# Patient Record
Sex: MALE | Race: WHITE | NOT HISPANIC OR LATINO | Employment: FULL TIME | URBAN - METROPOLITAN AREA
[De-identification: names, ages, dates, MRNs, and addresses within clinical notes are randomized per-mention and may not be internally consistent; named-entity substitution may affect disease eponyms.]

---

## 2017-01-23 ENCOUNTER — OFFICE VISIT - HEALTHEAST (OUTPATIENT)
Dept: FAMILY MEDICINE | Facility: CLINIC | Age: 52
End: 2017-01-23

## 2017-01-23 DIAGNOSIS — Z12.5 SCREENING FOR PROSTATE CANCER: ICD-10-CM

## 2017-01-23 DIAGNOSIS — E78.01 FAMILIAL HYPERCHOLESTEROLEMIA: ICD-10-CM

## 2017-01-23 DIAGNOSIS — R53.83 FATIGUE: ICD-10-CM

## 2017-01-23 DIAGNOSIS — I10 ESSENTIAL HYPERTENSION WITH GOAL BLOOD PRESSURE LESS THAN 130/80: ICD-10-CM

## 2017-01-23 DIAGNOSIS — I40.9 ACUTE MYOCARDITIS, UNSPECIFIED: ICD-10-CM

## 2017-01-23 DIAGNOSIS — F32.A MILD DEPRESSION: ICD-10-CM

## 2017-01-23 DIAGNOSIS — K21.00 GASTROESOPHAGEAL REFLUX DISEASE WITH ESOPHAGITIS: ICD-10-CM

## 2017-01-23 DIAGNOSIS — Z00.00 ROUTINE GENERAL MEDICAL EXAMINATION AT A HEALTH CARE FACILITY: ICD-10-CM

## 2017-01-23 LAB
CHOLEST SERPL-MCNC: 191 MG/DL
FASTING STATUS PATIENT QL REPORTED: YES
HDLC SERPL-MCNC: 33 MG/DL
LDLC SERPL CALC-MCNC: 113 MG/DL
PSA SERPL-MCNC: 1 NG/ML (ref 0–3.5)
TRIGL SERPL-MCNC: 223 MG/DL

## 2017-01-23 ASSESSMENT — MIFFLIN-ST. JEOR: SCORE: 2027.87

## 2017-01-24 ENCOUNTER — COMMUNICATION - HEALTHEAST (OUTPATIENT)
Dept: FAMILY MEDICINE | Facility: CLINIC | Age: 52
End: 2017-01-24

## 2017-03-29 ENCOUNTER — COMMUNICATION - HEALTHEAST (OUTPATIENT)
Dept: FAMILY MEDICINE | Facility: CLINIC | Age: 52
End: 2017-03-29

## 2017-03-29 DIAGNOSIS — K21.9 GERD (GASTROESOPHAGEAL REFLUX DISEASE): ICD-10-CM

## 2017-07-13 ENCOUNTER — OFFICE VISIT - HEALTHEAST (OUTPATIENT)
Dept: FAMILY MEDICINE | Facility: CLINIC | Age: 52
End: 2017-07-13

## 2017-07-13 DIAGNOSIS — R31.9 HEMATURIA: ICD-10-CM

## 2017-07-13 DIAGNOSIS — B35.6 TINEA CRURIS: ICD-10-CM

## 2017-07-17 ENCOUNTER — AMBULATORY - HEALTHEAST (OUTPATIENT)
Dept: FAMILY MEDICINE | Facility: CLINIC | Age: 52
End: 2017-07-17

## 2017-07-17 ENCOUNTER — COMMUNICATION - HEALTHEAST (OUTPATIENT)
Dept: FAMILY MEDICINE | Facility: CLINIC | Age: 52
End: 2017-07-17

## 2017-07-24 ENCOUNTER — AMBULATORY - HEALTHEAST (OUTPATIENT)
Dept: FAMILY MEDICINE | Facility: CLINIC | Age: 52
End: 2017-07-24

## 2017-09-28 ENCOUNTER — OFFICE VISIT (OUTPATIENT)
Dept: URGENT CARE | Facility: URGENT CARE | Age: 52
End: 2017-09-28
Payer: COMMERCIAL

## 2017-09-28 ENCOUNTER — RECORDS - HEALTHEAST (OUTPATIENT)
Dept: ADMINISTRATIVE | Facility: OTHER | Age: 52
End: 2017-09-28

## 2017-09-28 VITALS
BODY MASS INDEX: 34.44 KG/M2 | TEMPERATURE: 97.7 F | SYSTOLIC BLOOD PRESSURE: 108 MMHG | HEART RATE: 72 BPM | OXYGEN SATURATION: 95 % | DIASTOLIC BLOOD PRESSURE: 72 MMHG | WEIGHT: 246.8 LBS

## 2017-09-28 DIAGNOSIS — R10.13 EPIGASTRIC PAIN: ICD-10-CM

## 2017-09-28 DIAGNOSIS — R53.83 FATIGUE, UNSPECIFIED TYPE: ICD-10-CM

## 2017-09-28 DIAGNOSIS — K29.00 ACUTE GASTRITIS WITHOUT HEMORRHAGE, UNSPECIFIED GASTRITIS TYPE: Primary | ICD-10-CM

## 2017-09-28 LAB
ERYTHROCYTE [DISTWIDTH] IN BLOOD BY AUTOMATED COUNT: 12.7 % (ref 10–15)
HCT VFR BLD AUTO: 49.2 % (ref 40–53)
HGB BLD-MCNC: 16.6 G/DL (ref 13.3–17.7)
MCH RBC QN AUTO: 31.9 PG (ref 26.5–33)
MCHC RBC AUTO-ENTMCNC: 33.7 G/DL (ref 31.5–36.5)
MCV RBC AUTO: 95 FL (ref 78–100)
PLATELET # BLD AUTO: 242 10E9/L (ref 150–450)
RBC # BLD AUTO: 5.2 10E12/L (ref 4.4–5.9)
WBC # BLD AUTO: 9.7 10E9/L (ref 4–11)

## 2017-09-28 PROCEDURE — 82306 VITAMIN D 25 HYDROXY: CPT | Performed by: FAMILY MEDICINE

## 2017-09-28 PROCEDURE — 85027 COMPLETE CBC AUTOMATED: CPT | Performed by: FAMILY MEDICINE

## 2017-09-28 PROCEDURE — 82550 ASSAY OF CK (CPK): CPT | Performed by: FAMILY MEDICINE

## 2017-09-28 PROCEDURE — 99203 OFFICE O/P NEW LOW 30 MIN: CPT | Performed by: FAMILY MEDICINE

## 2017-09-28 PROCEDURE — 80048 BASIC METABOLIC PNL TOTAL CA: CPT | Performed by: FAMILY MEDICINE

## 2017-09-28 PROCEDURE — 84443 ASSAY THYROID STIM HORMONE: CPT | Performed by: FAMILY MEDICINE

## 2017-09-28 PROCEDURE — 36415 COLL VENOUS BLD VENIPUNCTURE: CPT | Performed by: FAMILY MEDICINE

## 2017-09-28 PROCEDURE — 93000 ELECTROCARDIOGRAM COMPLETE: CPT | Performed by: FAMILY MEDICINE

## 2017-09-28 RX ORDER — CLOPIDOGREL BISULFATE 75 MG/1
TABLET ORAL
COMMUNITY
Start: 2017-01-23

## 2017-09-28 RX ORDER — LOSARTAN POTASSIUM 100 MG/1
TABLET ORAL
COMMUNITY
Start: 2017-01-23

## 2017-09-28 RX ORDER — HYDROCHLOROTHIAZIDE 50 MG/1
TABLET ORAL
COMMUNITY
Start: 2017-01-23

## 2017-09-28 RX ORDER — ATORVASTATIN CALCIUM 80 MG/1
80 TABLET, FILM COATED ORAL
COMMUNITY
Start: 2017-01-23

## 2017-09-28 RX ORDER — SERTRALINE HYDROCHLORIDE 100 MG/1
TABLET, FILM COATED ORAL
COMMUNITY
Start: 2017-01-23

## 2017-09-28 RX ORDER — ERGOCALCIFEROL 1.25 MG/1
50000 CAPSULE, LIQUID FILLED ORAL
COMMUNITY

## 2017-09-28 RX ORDER — METOPROLOL SUCCINATE 25 MG/1
TABLET, EXTENDED RELEASE ORAL
COMMUNITY
Start: 2017-08-17

## 2017-09-28 RX ORDER — POTASSIUM CHLORIDE 750 MG/1
CAPSULE, EXTENDED RELEASE ORAL
COMMUNITY
Start: 2016-09-16

## 2017-09-28 RX ORDER — MULTIVIT-MIN/IRON/FOLIC ACID/K 18-600-40
CAPSULE ORAL
COMMUNITY

## 2017-09-28 NOTE — PATIENT INSTRUCTIONS
Epigastric Pain (Uncertain Cause)     Epigastric pain can be a sign of disease in the upper abdomen. Common causes include:    Acid reflux (stomach acid flowing up into the esophagus)    Gastritis (irritation of the stomach lining)    Peptic Ulcer Disease    Inflammation of the pancreas    Gallstone    Infection in the gallbladder  Pain may be dull or burning. It may spread upward to the chest or to the back. There may be other symptoms such as belching, bloating, cramps or hunger pains. There may be weight loss or poor appetite, nausea or vomiting.  Since the diagnosis of your pain is not certain yet, further tests may sometimes be needed. Sometimes the doctor will treat you for the most likely condition to see if there is improvement before doing further tests.  Home care  Medicines    Antacids help neutralize the normal acids in your stomach. Examples are Maalox, Mylanta, Rolaids, and Tums. If you don t like the liquid, you can also try a chewable one. You may find one works better than another for you. Overuse can cause diarrhea or constipation.    Acid blockers (H2 blockers) decrease acid production. Examples are cimetidine (Tagamet), famotidine (Pepcid) and ranitidine (Zantac).    Acid inhibitors (PPIs) decrease acid production in a different way than the blockers. You may find they work better, but can take a little longer to take effect.  Examples are omeprazole (Prilosec), lansoprazole (Prevacid), pantoprazole (Protonix), rabeprazole (Aciphex), and esomeprazole (Nexium).    Take an antacid 30-60 minutes after eating and at bedtime, but not at the same time as an acid blocker.    Try not to take NSAIDs. Aspirin may also cause problems, but if taking it for your heart or other medical reasons, talk to your doctor before stopping it; you do not want to cause a worse problem, like a heart attack or stroke.  Diet    If certain foods seem to cause your spasm, try to avoid them.     Eat slowly and chew food well  before swallowing. Symptoms of gastritis can be worsened by certain foods. Limit or avoid fatty, fried, and spicy foods, as well as coffee, chocolate, mint, and foods with high acid content such as tomatoes and citrus fruit and juices (orange, grapefruit, lemon).    Avoid alcohol, caffeine, and tobacco, which can delay healing and worsen your problem.    Try eating smaller meals with snacks in between  Follow-up care  Follow up with your healthcare provider or as advised.  When to seek medical advice  Call your healthcare provider right away if any of the following occur:    Stomach pain worsens or moves to the right lower part of the abdomen    Chest pain appears, or if it worsens or spreads to the chest, back, neck, shoulder, or arm    Frequent vomiting (can t keep down liquids)    Blood in the stool or vomit (red or black color)    Feeling weak or dizzy, fainting, or having trouble breathing    Fever of 100.4 F (38 C) or higher, or as directed by your healthcare provider    Abdominal swelling  Date Last Reviewed: 9/25/2015 2000-2017 [employer]. [address] [place] [address], [place] [address]. All rights reserved. This information is not intended as a substitute for professional medical care. Always follow your healthcare professional's instructions.

## 2017-09-28 NOTE — LETTER
[facility]  [address]  [address]  [place] [address]  Phone: [phone]  Fax: [phone]    09/28/17    Osbaldo Garcia  [address] [address] [address]      To whom it may concern:     Osbaldo Garcia was seen tonight at [facility].  He was unable to attend work today secondary to a medical illness.  Thank you.     Sincerely,      Lamar Woodruff MD

## 2017-09-28 NOTE — MR AVS SNAPSHOT
After Visit Summary   9/28/2017    Osbaldo Garcia    MRN: [id number]           Patient Information     Date Of Birth          1965        Visit Information        Provider Department      9/28/2017 4:45 PM Lamar Lambert MD [facility]        Today's Diagnoses     Epigastric pain    -  1    Fatigue, unspecified type          Care Instructions      Epigastric Pain (Uncertain Cause)     Epigastric pain can be a sign of disease in the upper abdomen. Common causes include:    Acid reflux (stomach acid flowing up into the esophagus)    Gastritis (irritation of the stomach lining)    Peptic Ulcer Disease    Inflammation of the pancreas    Gallstone    Infection in the gallbladder  Pain may be dull or burning. It may spread upward to the chest or to the back. There may be other symptoms such as belching, bloating, cramps or hunger pains. There may be weight loss or poor appetite, nausea or vomiting.  Since the diagnosis of your pain is not certain yet, further tests may sometimes be needed. Sometimes the doctor will treat you for the most likely condition to see if there is improvement before doing further tests.  Home care  Medicines    Antacids help neutralize the normal acids in your stomach. Examples are Maalox, Mylanta, Rolaids, and Tums. If you don t like the liquid, you can also try a chewable one. You may find one works better than another for you. Overuse can cause diarrhea or constipation.    Acid blockers (H2 blockers) decrease acid production. Examples are cimetidine (Tagamet), famotidine (Pepcid) and ranitidine (Zantac).    Acid inhibitors (PPIs) decrease acid production in a different way than the blockers. You may find they work better, but can take a little longer to take effect.  Examples are omeprazole (Prilosec), lansoprazole (Prevacid), pantoprazole (Protonix), rabeprazole (Aciphex), and esomeprazole (Nexium).    Take an antacid 30-60 minutes after eating  and at bedtime, but not at the same time as an acid blocker.    Try not to take NSAIDs. Aspirin may also cause problems, but if taking it for your heart or other medical reasons, talk to your doctor before stopping it; you do not want to cause a worse problem, like a heart attack or stroke.  Diet    If certain foods seem to cause your spasm, try to avoid them.     Eat slowly and chew food well before swallowing. Symptoms of gastritis can be worsened by certain foods. Limit or avoid fatty, fried, and spicy foods, as well as coffee, chocolate, mint, and foods with high acid content such as tomatoes and citrus fruit and juices (orange, grapefruit, lemon).    Avoid alcohol, caffeine, and tobacco, which can delay healing and worsen your problem.    Try eating smaller meals with snacks in between  Follow-up care  Follow up with your healthcare provider or as advised.  When to seek medical advice  Call your healthcare provider right away if any of the following occur:    Stomach pain worsens or moves to the right lower part of the abdomen    Chest pain appears, or if it worsens or spreads to the chest, back, neck, shoulder, or arm    Frequent vomiting (can t keep down liquids)    Blood in the stool or vomit (red or black color)    Feeling weak or dizzy, fainting, or having trouble breathing    Fever of 100.4 F (38 C) or higher, or as directed by your healthcare provider    Abdominal swelling  Date Last Reviewed: 9/25/2015 2000-2017 [employer]. [address] [place] [address]. All rights reserved. This information is not intended as a substitute for professional medical care. Always follow your healthcare professional's instructions.                Follow-ups after your visit        Follow-up notes from your care team     Return in about 5 days (around 10/3/2017).      Who to contact     If you have questions or need follow up information about today's clinic visit or your schedule please contact  "[facility] directly at [phone].  Normal or non-critical lab and imaging results will be communicated to you by MyChart, letter or phone within 4 business days after the clinic has received the results. If you do not hear from us within 7 days, please contact the clinic through [employer] or phone. If you have a critical or abnormal lab result, we will notify you by phone as soon as possible.  Submit refill requests through [employer] or call your pharmacy and they will forward the refill request to us. Please allow 3 business days for your refill to be completed.          Additional Information About Your Visit        [employer] Information     [employer] lets you send messages to your doctor, view your test results, renew your prescriptions, schedule appointments and more. To sign up, go to [email or web address] . Click on \"Log in\" on the left side of the screen, which will take you to the Welcome page. Then click on \"Sign up Now\" on the right side of the page.     You will be asked to enter the access code listed below, as well as some personal information. Please follow the directions to create your username and password.     Your access code is: [email or web address]  Expires: 2017  6:21 PM     Your access code will  in 90 days. If you need help or a new code, please call your [place] clinic or [phone].        Care EveryWhere ID     This is your Care EveryWhere ID. This could be used by other organizations to access your [place] medical records  [id number]        Your Vitals Were     Pulse Temperature Pulse Oximetry BMI (Body Mass Index)          72 97.7  F (36.5  C) (Tympanic) 95% 34.44 kg/m2         Blood Pressure from Last 3 Encounters:   17 108/72    Weight from Last 3 Encounters:   17 246 lb 12.8 oz (111.9 kg)   03/05/15 253 lb (114.8 kg)   02/05/15 255 lb (115.7 kg)              We Performed the Following     Basic metabolic panel  (Ca, Cl, CO2, Creat, Gluc, K, Na, BUN)  "    CBC with platelets     CK total     EKG 12-lead complete w/read - Clinics     TSH with free T4 reflex     Vitamin D Deficiency          Today's Medication Changes          These changes are accurate as of: 9/28/17  6:21 PM.  If you have any questions, ask your nurse or doctor.               Start taking these medicines.        Dose/Directions    lidocaine (viscous) 15 mL, alum & mag hydroxide-simethicone 15 mL GI Cocktail   Used for:  Epigastric pain   Started by:  Lamar Lambert MD        Dose:  30 mL   Take 30 mLs by mouth once for 1 dose   Quantity:  30 mL   Refills:  0       ranitidine 150 MG tablet   Commonly known as:  ZANTAC   Used for:  Epigastric pain   Started by:  Lamar Lambert MD        Dose:  150 mg   Take 1 tablet (150 mg) by mouth 2 times daily   Quantity:  60 tablet   Refills:  1            Where to get your medicines      These medications were sent to [facility] [address] [place] [id number] [facility]  [address] [address] [facility] [address]     Phone:  [phone]     ranitidine 150 MG tablet         Some of these will need a paper prescription and others can be bought over the counter.  Ask your nurse if you have questions.     You don't need a prescription for these medications     lidocaine (viscous) 15 mL, alum & mag hydroxide-simethicone 15 mL GI Cocktail                Primary Care Provider    None Specified       No primary provider on file.        Equal Access to Services     [facility] CenterEVANGELIST : Hadgeoff garcia Somartin, waaxda luqadaha, qaybta kaalmada aderuel, munira tang. So [facility] [phone].    ATENCIÓN: Si habla español, tiene a garcia disposición servicios gratuitos de asistencia lingüística. Laura al [phone].    We comply with applicable federal civil rights laws and [place] laws. We do not discriminate on the basis of race, color, national origin, age, disability sex, sexual orientation or gender  identity.            Thank you!     Thank you for choosing [facility]  for your care. Our goal is always to provide you with excellent care. Hearing back from our patients is one way we can continue to improve our services. Please take a few minutes to complete the written survey that you may receive in the mail after your visit with us. Thank you!             Your Updated Medication List - Protect others around you: Learn how to safely use, store and throw away your medicines at www.disposemymeds.org.          This list is accurate as of: 9/28/17  6:21 PM.  Always use your most recent med list.                   Brand Name Dispense Instructions for use Diagnosis    atorvastatin 80 MG tablet    LIPITOR     Take 80 mg by mouth        cholecalciferol 1000 UNIT tablet    vitamin D     Take 1,000 Units by mouth        clopidogrel 75 MG tablet    PLAVIX     Take 75 mg by mouth.        hydrochlorothiazide 50 MG tablet    HYDRODIURIL     Take 50 mg by mouth.        lidocaine (viscous) 15 mL, alum & mag hydroxide-simethicone 15 mL GI Cocktail     30 mL    Take 30 mLs by mouth once for 1 dose    Epigastric pain       losartan 100 MG tablet    COZAAR     Take 100 mg by mouth.        metoprolol 25 MG 24 hr tablet    TOPROL-XL     TAKE ONE TABLET BY MOUTH ONCE DAILY *DUE  FOR  LABS*        omeprazole 20 MG CR capsule    priLOSEC     TAKE ONE CAPSULE BY MOUTH TWICE DAILY        potassium chloride 10 MEQ CR capsule    MICRO-K     TAKE ONE CAPSULE BY MOUTH ONCE DAILY        PROBIOTIC DAILY PO           ranitidine 150 MG tablet    ZANTAC    60 tablet    Take 1 tablet (150 mg) by mouth 2 times daily    Epigastric pain       sertraline 100 MG tablet    ZOLOFT     TAKE ONE TABLET (50MG) BY MOUTH ONCE DAILY        Vitamin C 500 MG Caps           vitamin D 96137 UNIT capsule    ERGOCALCIFEROL     Take 50,000 Units by mouth

## 2017-09-28 NOTE — NURSING NOTE
"Chief Complaint   Patient presents with     Urgent Care     Gastrointestinal Problem     fatigued, gastrointestinal problems x 1 week, comes and goes. Patient has had a recent medication change.     Anxiety     anxiety x1 week. Patient states it may have been a heart attack.       Initial /72 (BP Location: Right arm, Patient Position: Chair, Cuff Size: Adult Large)  Pulse 72  Temp 97.7  F (36.5  C) (Tympanic)  Wt 246 lb 12.8 oz (111.9 kg)  SpO2 95%  BMI 34.44 kg/m2 Estimated body mass index is 34.44 kg/(m^2) as calculated from the following:    Height as of 3/5/15: 5' 10.98\" (1.803 m).    Weight as of this encounter: 246 lb 12.8 oz (111.9 kg).  Medication Reconciliation: unable or not appropriate to perform   Dulce Maria Valerio Medical Assistant      "

## 2017-09-28 NOTE — PROGRESS NOTES
"(S) Osbaldo Garcia is a 52 year old male with complaint of gastrointestinal symptoms of pain in the epigastric area on and off for the past 2 months.  Takes Prilosec BID.  Has been feeling very tired for the past 4 days.  Did not go to work this week and is anxious about his job since he has not been able to go. No fevers.  No nausea or vomiting.  Has watery stools about 1 time per week.  Today, had an orange looking stool.  No blood in the stool.  No recent travels.  No ill contacts.  Eating about 1 time per day secondary to decreased appetite.      Also, reports had a \"panic attack\" last week and did call his cardiologist thinking it could be his heart but told to go to the ED.  He did not go and is wondering whether we can do some blood testing for that today.  No angina pain noted.  He did suffer a MI in 2014 and that pain was substernal.  No angioplasty or stents placed. Saw his PCP 6 weeks ago at [facility].         (O) /72 (BP Location: Right arm, Patient Position: Chair, Cuff Size: Adult Large)  Pulse 72  Temp 97.7  F (36.5  C) (Tympanic)  Wt 246 lb 12.8 oz (111.9 kg)  SpO2 95%  BMI 34.44 kg/m2   Physical exam reveals the patient appears well. HEENT: neck: supple, no adenopathy.  Lungs: CTA B. CV: RRR, normal S1, S2, no murmurs. Abdomen: +BS, mild and moderate tenderness in the epigastric area, no rebound tenderness, no guarding or rigidity, no CVA tenderness.    EKG: NSR at 70 bpm, no ischemic changes  Labs:   Results for orders placed or performed in visit on 09/28/17   CBC with platelets   Result Value Ref Range    WBC 9.7 4.0 - 11.0 10e9/L    RBC Count 5.20 4.4 - 5.9 10e12/L    Hemoglobin 16.6 13.3 - 17.7 g/dL    Hematocrit 49.2 40.0 - 53.0 %    MCV 95 78 - 100 fl    MCH 31.9 26.5 - 33.0 pg    MCHC 33.7 31.5 - 36.5 g/dL    RDW 12.7 10.0 - 15.0 %    Platelet Count 242 150 - 450 10e9/L        (A) Gastritis and Fatigue    (P) GI cocktail helped his pain.  I have recommended avoid " gastritis triggers.  Stop Prilosec for now.  Start Zantac as per orders. Note for work given.,  Return office visit if symptoms persist or worsen; I have alerted the patient to call if high fever, dehydration, marked weakness, fainting, increased abdominal pain, blood in stool or vomit.  He is to see his cardiologist for further cares.  Await pending labs.   Lamar Woodruff MD

## 2017-09-29 LAB
ANION GAP SERPL CALCULATED.3IONS-SCNC: 9 MMOL/L (ref 3–14)
BUN SERPL-MCNC: 20 MG/DL (ref 7–30)
CALCIUM SERPL-MCNC: 9.4 MG/DL (ref 8.5–10.1)
CHLORIDE SERPL-SCNC: 106 MMOL/L (ref 94–109)
CK SERPL-CCNC: 171 U/L (ref 30–300)
CO2 SERPL-SCNC: 27 MMOL/L (ref 20–32)
CREAT SERPL-MCNC: 0.94 MG/DL (ref 0.66–1.25)
DEPRECATED CALCIDIOL+CALCIFEROL SERPL-MC: 41 UG/L (ref 20–75)
GFR SERPL CREATININE-BSD FRML MDRD: 84 ML/MIN/1.7M2
GLUCOSE SERPL-MCNC: 87 MG/DL (ref 70–99)
POTASSIUM SERPL-SCNC: 4.2 MMOL/L (ref 3.4–5.3)
SODIUM SERPL-SCNC: 142 MMOL/L (ref 133–144)
TSH SERPL DL<=0.005 MIU/L-ACNC: 2.65 MU/L (ref 0.4–4)

## 2017-10-06 ENCOUNTER — COMMUNICATION - HEALTHEAST (OUTPATIENT)
Dept: FAMILY MEDICINE | Facility: CLINIC | Age: 52
End: 2017-10-06

## 2017-10-11 ENCOUNTER — AMBULATORY - HEALTHEAST (OUTPATIENT)
Dept: FAMILY MEDICINE | Facility: CLINIC | Age: 52
End: 2017-10-11

## 2017-10-18 ENCOUNTER — OFFICE VISIT - HEALTHEAST (OUTPATIENT)
Dept: FAMILY MEDICINE | Facility: CLINIC | Age: 52
End: 2017-10-18

## 2017-10-18 DIAGNOSIS — M79.10 MYALGIA: ICD-10-CM

## 2017-10-18 DIAGNOSIS — G47.33 OSA (OBSTRUCTIVE SLEEP APNEA): ICD-10-CM

## 2017-10-18 DIAGNOSIS — R10.9 STOMACH PAIN: ICD-10-CM

## 2017-10-18 DIAGNOSIS — Z23 FLU VACCINE NEED: ICD-10-CM

## 2017-10-18 DIAGNOSIS — I10 ESSENTIAL HYPERTENSION: ICD-10-CM

## 2017-10-18 DIAGNOSIS — Z12.11 SCREEN FOR COLON CANCER: ICD-10-CM

## 2017-10-18 DIAGNOSIS — E78.01 FAMILIAL HYPERCHOLESTEROLEMIA: ICD-10-CM

## 2017-10-18 ASSESSMENT — MIFFLIN-ST. JEOR: SCORE: 1973.44

## 2017-10-20 ENCOUNTER — COMMUNICATION - HEALTHEAST (OUTPATIENT)
Dept: FAMILY MEDICINE | Facility: CLINIC | Age: 52
End: 2017-10-20

## 2017-11-03 ENCOUNTER — RECORDS - HEALTHEAST (OUTPATIENT)
Dept: ADMINISTRATIVE | Facility: OTHER | Age: 52
End: 2017-11-03

## 2017-11-06 ENCOUNTER — RECORDS - HEALTHEAST (OUTPATIENT)
Dept: ADMINISTRATIVE | Facility: OTHER | Age: 52
End: 2017-11-06

## 2017-12-04 ENCOUNTER — COMMUNICATION - HEALTHEAST (OUTPATIENT)
Dept: FAMILY MEDICINE | Facility: CLINIC | Age: 52
End: 2017-12-04

## 2018-02-12 ENCOUNTER — COMMUNICATION - HEALTHEAST (OUTPATIENT)
Dept: FAMILY MEDICINE | Facility: CLINIC | Age: 53
End: 2018-02-12

## 2018-02-12 DIAGNOSIS — F32.A MILD DEPRESSION: ICD-10-CM

## 2018-02-12 DIAGNOSIS — I10 ESSENTIAL HYPERTENSION WITH GOAL BLOOD PRESSURE LESS THAN 130/80: ICD-10-CM

## 2018-02-26 ENCOUNTER — OFFICE VISIT - HEALTHEAST (OUTPATIENT)
Dept: FAMILY MEDICINE | Facility: CLINIC | Age: 53
End: 2018-02-26

## 2018-02-26 DIAGNOSIS — I25.10 CAD (CORONARY ARTERY DISEASE): ICD-10-CM

## 2018-02-26 DIAGNOSIS — E78.01 FAMILIAL HYPERCHOLESTEROLEMIA: ICD-10-CM

## 2018-02-26 DIAGNOSIS — I10 HYPERTENSION: ICD-10-CM

## 2018-02-26 DIAGNOSIS — M60.9 MYOSITIS: ICD-10-CM

## 2018-02-26 DIAGNOSIS — R21 RASH: ICD-10-CM

## 2018-02-26 LAB
ANION GAP SERPL CALCULATED.3IONS-SCNC: 11 MMOL/L (ref 5–18)
BUN SERPL-MCNC: 16 MG/DL (ref 8–22)
CALCIUM SERPL-MCNC: 9.5 MG/DL (ref 8.5–10.5)
CHLORIDE BLD-SCNC: 103 MMOL/L (ref 98–107)
CHOLEST SERPL-MCNC: 263 MG/DL
CK SERPL-CCNC: 142 U/L (ref 30–190)
CO2 SERPL-SCNC: 28 MMOL/L (ref 22–31)
CREAT SERPL-MCNC: 0.92 MG/DL (ref 0.7–1.3)
ERYTHROCYTE [DISTWIDTH] IN BLOOD BY AUTOMATED COUNT: 11.6 % (ref 11–14.5)
FASTING STATUS PATIENT QL REPORTED: YES
GFR SERPL CREATININE-BSD FRML MDRD: >60 ML/MIN/1.73M2
GLUCOSE BLD-MCNC: 98 MG/DL (ref 70–125)
HCT VFR BLD AUTO: 50.8 % (ref 40–54)
HDLC SERPL-MCNC: 43 MG/DL
HGB BLD-MCNC: 17.2 G/DL (ref 14–18)
LDLC SERPL CALC-MCNC: 181 MG/DL
MCH RBC QN AUTO: 32.2 PG (ref 27–34)
MCHC RBC AUTO-ENTMCNC: 33.8 G/DL (ref 32–36)
MCV RBC AUTO: 95 FL (ref 80–100)
PLATELET # BLD AUTO: 250 THOU/UL (ref 140–440)
PMV BLD AUTO: 7.7 FL (ref 7–10)
POTASSIUM BLD-SCNC: 4.3 MMOL/L (ref 3.5–5)
RBC # BLD AUTO: 5.33 MILL/UL (ref 4.4–6.2)
SODIUM SERPL-SCNC: 142 MMOL/L (ref 136–145)
TRIGL SERPL-MCNC: 195 MG/DL
WBC: 6.9 THOU/UL (ref 4–11)

## 2018-02-26 ASSESSMENT — MIFFLIN-ST. JEOR: SCORE: 1996.12

## 2018-02-27 ENCOUNTER — COMMUNICATION - HEALTHEAST (OUTPATIENT)
Dept: FAMILY MEDICINE | Facility: CLINIC | Age: 53
End: 2018-02-27

## 2018-02-27 ENCOUNTER — AMBULATORY - HEALTHEAST (OUTPATIENT)
Dept: FAMILY MEDICINE | Facility: CLINIC | Age: 53
End: 2018-02-27

## 2018-05-11 ENCOUNTER — COMMUNICATION - HEALTHEAST (OUTPATIENT)
Dept: FAMILY MEDICINE | Facility: CLINIC | Age: 53
End: 2018-05-11

## 2018-05-11 DIAGNOSIS — I10 HYPERTENSION: ICD-10-CM

## 2018-06-06 ENCOUNTER — COMMUNICATION - HEALTHEAST (OUTPATIENT)
Dept: FAMILY MEDICINE | Facility: CLINIC | Age: 53
End: 2018-06-06

## 2018-06-06 DIAGNOSIS — I10 ESSENTIAL HYPERTENSION WITH GOAL BLOOD PRESSURE LESS THAN 130/80: ICD-10-CM

## 2018-08-29 ENCOUNTER — OFFICE VISIT - HEALTHEAST (OUTPATIENT)
Dept: FAMILY MEDICINE | Facility: CLINIC | Age: 53
End: 2018-08-29

## 2018-08-29 DIAGNOSIS — I25.10 CAD (CORONARY ARTERY DISEASE): ICD-10-CM

## 2018-08-29 DIAGNOSIS — Z12.5 SCREENING FOR PROSTATE CANCER: ICD-10-CM

## 2018-08-29 DIAGNOSIS — E78.01 FAMILIAL HYPERCHOLESTEROLEMIA: ICD-10-CM

## 2018-08-29 DIAGNOSIS — I10 ESSENTIAL HYPERTENSION: ICD-10-CM

## 2018-08-29 DIAGNOSIS — Z00.00 ROUTINE GENERAL MEDICAL EXAMINATION AT A HEALTH CARE FACILITY: ICD-10-CM

## 2018-08-29 LAB
ALBUMIN SERPL-MCNC: 3.8 G/DL (ref 3.5–5)
ALP SERPL-CCNC: 66 U/L (ref 45–120)
ALT SERPL W P-5'-P-CCNC: 34 U/L (ref 0–45)
ANION GAP SERPL CALCULATED.3IONS-SCNC: 8 MMOL/L (ref 5–18)
AST SERPL W P-5'-P-CCNC: 24 U/L (ref 0–40)
ATRIAL RATE - MUSE: 54 BPM
BILIRUB SERPL-MCNC: 0.4 MG/DL (ref 0–1)
BUN SERPL-MCNC: 18 MG/DL (ref 8–22)
CALCIUM SERPL-MCNC: 9.3 MG/DL (ref 8.5–10.5)
CHLORIDE BLD-SCNC: 109 MMOL/L (ref 98–107)
CHOLEST SERPL-MCNC: 219 MG/DL
CO2 SERPL-SCNC: 25 MMOL/L (ref 22–31)
CREAT SERPL-MCNC: 0.94 MG/DL (ref 0.7–1.3)
DIASTOLIC BLOOD PRESSURE - MUSE: NORMAL MMHG
ERYTHROCYTE [DISTWIDTH] IN BLOOD BY AUTOMATED COUNT: 11.7 % (ref 11–14.5)
FASTING STATUS PATIENT QL REPORTED: ABNORMAL
GFR SERPL CREATININE-BSD FRML MDRD: >60 ML/MIN/1.73M2
GLUCOSE BLD-MCNC: 103 MG/DL (ref 70–125)
HCT VFR BLD AUTO: 45.9 % (ref 40–54)
HDLC SERPL-MCNC: 36 MG/DL
HGB BLD-MCNC: 15.6 G/DL (ref 14–18)
INTERPRETATION ECG - MUSE: NORMAL
LDLC SERPL CALC-MCNC: 130 MG/DL
MCH RBC QN AUTO: 31.2 PG (ref 27–34)
MCHC RBC AUTO-ENTMCNC: 33.9 G/DL (ref 32–36)
MCV RBC AUTO: 92 FL (ref 80–100)
P AXIS - MUSE: 43 DEGREES
PLATELET # BLD AUTO: 264 THOU/UL (ref 140–440)
PMV BLD AUTO: 7.7 FL (ref 7–10)
POTASSIUM BLD-SCNC: 4.6 MMOL/L (ref 3.5–5)
PR INTERVAL - MUSE: 128 MS
PROT SERPL-MCNC: 6.6 G/DL (ref 6–8)
PSA SERPL-MCNC: 1 NG/ML (ref 0–3.5)
QRS DURATION - MUSE: 90 MS
QT - MUSE: 430 MS
QTC - MUSE: 407 MS
R AXIS - MUSE: 37 DEGREES
RBC # BLD AUTO: 4.99 MILL/UL (ref 4.4–6.2)
SODIUM SERPL-SCNC: 142 MMOL/L (ref 136–145)
SYSTOLIC BLOOD PRESSURE - MUSE: NORMAL MMHG
T AXIS - MUSE: 19 DEGREES
TRIGL SERPL-MCNC: 263 MG/DL
TSH SERPL DL<=0.005 MIU/L-ACNC: 1.16 UIU/ML (ref 0.3–5)
VENTRICULAR RATE- MUSE: 54 BPM
WBC: 6.5 THOU/UL (ref 4–11)

## 2018-08-29 ASSESSMENT — MIFFLIN-ST. JEOR: SCORE: 2018.8

## 2018-08-30 ENCOUNTER — AMBULATORY - HEALTHEAST (OUTPATIENT)
Dept: FAMILY MEDICINE | Facility: CLINIC | Age: 53
End: 2018-08-30

## 2018-08-30 LAB — 25(OH)D3 SERPL-MCNC: 31.7 NG/ML (ref 30–80)

## 2018-08-31 ENCOUNTER — COMMUNICATION - HEALTHEAST (OUTPATIENT)
Dept: FAMILY MEDICINE | Facility: CLINIC | Age: 53
End: 2018-08-31

## 2018-12-03 ENCOUNTER — OFFICE VISIT - HEALTHEAST (OUTPATIENT)
Dept: FAMILY MEDICINE | Facility: CLINIC | Age: 53
End: 2018-12-03

## 2018-12-03 DIAGNOSIS — Z23 FLU VACCINE NEED: ICD-10-CM

## 2018-12-03 DIAGNOSIS — E78.00 HYPERCHOLESTEROLEMIA: ICD-10-CM

## 2018-12-03 DIAGNOSIS — I10 BENIGN ESSENTIAL HYPERTENSION: ICD-10-CM

## 2018-12-03 LAB
ANION GAP SERPL CALCULATED.3IONS-SCNC: 9 MMOL/L (ref 5–18)
BUN SERPL-MCNC: 15 MG/DL (ref 8–22)
CALCIUM SERPL-MCNC: 9.7 MG/DL (ref 8.5–10.5)
CHLORIDE BLD-SCNC: 104 MMOL/L (ref 98–107)
CHOLEST SERPL-MCNC: 228 MG/DL
CO2 SERPL-SCNC: 28 MMOL/L (ref 22–31)
CREAT SERPL-MCNC: 0.8 MG/DL (ref 0.7–1.3)
FASTING STATUS PATIENT QL REPORTED: YES
GFR SERPL CREATININE-BSD FRML MDRD: >60 ML/MIN/1.73M2
GLUCOSE BLD-MCNC: 99 MG/DL (ref 70–125)
HDLC SERPL-MCNC: 38 MG/DL
LDLC SERPL CALC-MCNC: 128 MG/DL
POTASSIUM BLD-SCNC: 4.3 MMOL/L (ref 3.5–5)
SODIUM SERPL-SCNC: 141 MMOL/L (ref 136–145)
TRIGL SERPL-MCNC: 308 MG/DL

## 2018-12-03 ASSESSMENT — MIFFLIN-ST. JEOR: SCORE: 2000.66

## 2018-12-11 ENCOUNTER — COMMUNICATION - HEALTHEAST (OUTPATIENT)
Dept: FAMILY MEDICINE | Facility: CLINIC | Age: 53
End: 2018-12-11

## 2019-03-20 ENCOUNTER — COMMUNICATION - HEALTHEAST (OUTPATIENT)
Dept: FAMILY MEDICINE | Facility: CLINIC | Age: 54
End: 2019-03-20

## 2019-03-20 DIAGNOSIS — K21.9 GERD (GASTROESOPHAGEAL REFLUX DISEASE): ICD-10-CM

## 2019-03-23 ENCOUNTER — COMMUNICATION - HEALTHEAST (OUTPATIENT)
Dept: FAMILY MEDICINE | Facility: CLINIC | Age: 54
End: 2019-03-23

## 2019-03-23 DIAGNOSIS — I10 ESSENTIAL HYPERTENSION WITH GOAL BLOOD PRESSURE LESS THAN 130/80: ICD-10-CM

## 2019-04-30 ENCOUNTER — COMMUNICATION - HEALTHEAST (OUTPATIENT)
Dept: FAMILY MEDICINE | Facility: CLINIC | Age: 54
End: 2019-04-30

## 2019-04-30 DIAGNOSIS — E78.2 MIXED HYPERLIPIDEMIA: ICD-10-CM

## 2019-04-30 DIAGNOSIS — I10 ESSENTIAL HYPERTENSION WITH GOAL BLOOD PRESSURE LESS THAN 130/80: ICD-10-CM

## 2019-04-30 DIAGNOSIS — I25.10 CORONARY ARTERY DISEASE INVOLVING NATIVE CORONARY ARTERY OF NATIVE HEART WITHOUT ANGINA PECTORIS: ICD-10-CM

## 2019-07-26 ENCOUNTER — COMMUNICATION - HEALTHEAST (OUTPATIENT)
Dept: FAMILY MEDICINE | Facility: CLINIC | Age: 54
End: 2019-07-26

## 2019-07-26 DIAGNOSIS — I10 ESSENTIAL HYPERTENSION WITH GOAL BLOOD PRESSURE LESS THAN 130/80: ICD-10-CM

## 2019-07-26 DIAGNOSIS — I10 HYPERTENSION: ICD-10-CM

## 2019-08-07 ENCOUNTER — COMMUNICATION - HEALTHEAST (OUTPATIENT)
Dept: SCHEDULING | Facility: CLINIC | Age: 54
End: 2019-08-07

## 2019-08-08 ENCOUNTER — COMMUNICATION - HEALTHEAST (OUTPATIENT)
Dept: TELEHEALTH | Facility: CLINIC | Age: 54
End: 2019-08-08

## 2019-08-08 ENCOUNTER — RECORDS - HEALTHEAST (OUTPATIENT)
Dept: ADMINISTRATIVE | Facility: OTHER | Age: 54
End: 2019-08-08

## 2019-08-08 ENCOUNTER — OFFICE VISIT - HEALTHEAST (OUTPATIENT)
Dept: FAMILY MEDICINE | Facility: CLINIC | Age: 54
End: 2019-08-08

## 2019-08-08 ENCOUNTER — HOSPITAL ENCOUNTER (OUTPATIENT)
Dept: CT IMAGING | Facility: CLINIC | Age: 54
Discharge: HOME | End: 2019-08-08
Attending: FAMILY MEDICINE | Admitting: FAMILY MEDICINE
Payer: COMMERCIAL

## 2019-08-08 DIAGNOSIS — R31.29 MICROSCOPIC HEMATURIA: ICD-10-CM

## 2019-08-08 DIAGNOSIS — R52 PAIN: ICD-10-CM

## 2019-08-08 DIAGNOSIS — R31.29 MICROHEMATURIA: ICD-10-CM

## 2019-08-08 LAB
ALBUMIN UR-MCNC: ABNORMAL MG/DL
APPEARANCE UR: ABNORMAL
BILIRUB UR QL STRIP: ABNORMAL
COLOR UR AUTO: YELLOW
GLUCOSE UR STRIP-MCNC: NEGATIVE MG/DL
HGB UR QL STRIP: ABNORMAL
KETONES UR STRIP-MCNC: NEGATIVE MG/DL
LEUKOCYTE ESTERASE UR QL STRIP: NEGATIVE
NITRATE UR QL: NEGATIVE
PH UR STRIP: 6 [PH] (ref 5–8)
SP GR UR STRIP: >=1.03 (ref 1–1.03)
UROBILINOGEN UR STRIP-ACNC: ABNORMAL

## 2019-08-08 PROCEDURE — 74176 CT ABD & PELVIS W/O CONTRAST: CPT

## 2019-08-09 ENCOUNTER — COMMUNICATION - HEALTHEAST (OUTPATIENT)
Dept: FAMILY MEDICINE | Facility: CLINIC | Age: 54
End: 2019-08-09

## 2019-08-12 ENCOUNTER — OFFICE VISIT - HEALTHEAST (OUTPATIENT)
Dept: FAMILY MEDICINE | Facility: CLINIC | Age: 54
End: 2019-08-12

## 2019-08-12 ENCOUNTER — COMMUNICATION - HEALTHEAST (OUTPATIENT)
Dept: FAMILY MEDICINE | Facility: CLINIC | Age: 54
End: 2019-08-12

## 2019-08-12 ENCOUNTER — COMMUNICATION - HEALTHEAST (OUTPATIENT)
Dept: ADMINISTRATIVE | Facility: CLINIC | Age: 54
End: 2019-08-12

## 2019-08-12 ENCOUNTER — COMMUNICATION - HEALTHEAST (OUTPATIENT)
Dept: LAB | Facility: CLINIC | Age: 54
End: 2019-08-12

## 2019-08-12 DIAGNOSIS — N20.1 URETERAL STONE: ICD-10-CM

## 2019-08-12 DIAGNOSIS — I10 HYPERTENSION: ICD-10-CM

## 2019-08-12 DIAGNOSIS — K21.9 GERD (GASTROESOPHAGEAL REFLUX DISEASE): ICD-10-CM

## 2019-08-12 DIAGNOSIS — R21 RASH: ICD-10-CM

## 2019-08-12 DIAGNOSIS — Z11.3 SCREEN FOR STD (SEXUALLY TRANSMITTED DISEASE): ICD-10-CM

## 2019-08-12 DIAGNOSIS — I25.10 CORONARY ARTERY DISEASE INVOLVING NATIVE CORONARY ARTERY OF NATIVE HEART WITHOUT ANGINA PECTORIS: ICD-10-CM

## 2019-08-12 DIAGNOSIS — I10 ESSENTIAL HYPERTENSION WITH GOAL BLOOD PRESSURE LESS THAN 130/80: ICD-10-CM

## 2019-08-12 LAB
ALBUMIN SERPL-MCNC: 4.5 G/DL (ref 3.5–5)
ALP SERPL-CCNC: 85 U/L (ref 45–120)
ALT SERPL W P-5'-P-CCNC: 26 U/L (ref 0–45)
ANION GAP SERPL CALCULATED.3IONS-SCNC: 10 MMOL/L (ref 5–18)
AST SERPL W P-5'-P-CCNC: 17 U/L (ref 0–40)
BASOPHILS # BLD AUTO: 0.1 THOU/UL (ref 0–0.2)
BASOPHILS NFR BLD AUTO: 1 % (ref 0–2)
BILIRUB SERPL-MCNC: 0.4 MG/DL (ref 0–1)
BUN SERPL-MCNC: 25 MG/DL (ref 8–22)
CALCIUM SERPL-MCNC: 10.2 MG/DL (ref 8.5–10.5)
CHLORIDE BLD-SCNC: 102 MMOL/L (ref 98–107)
CHOLEST SERPL-MCNC: 225 MG/DL
CO2 SERPL-SCNC: 28 MMOL/L (ref 22–31)
CREAT SERPL-MCNC: 1.05 MG/DL (ref 0.7–1.3)
EOSINOPHIL # BLD AUTO: 0.2 THOU/UL (ref 0–0.4)
EOSINOPHIL NFR BLD AUTO: 2 % (ref 0–6)
ERYTHROCYTE [DISTWIDTH] IN BLOOD BY AUTOMATED COUNT: 11.6 % (ref 11–14.5)
FASTING STATUS PATIENT QL REPORTED: YES
GFR SERPL CREATININE-BSD FRML MDRD: >60 ML/MIN/1.73M2
GLUCOSE BLD-MCNC: 123 MG/DL (ref 70–125)
HCT VFR BLD AUTO: 50.4 % (ref 40–54)
HDLC SERPL-MCNC: 36 MG/DL
HGB BLD-MCNC: 17.4 G/DL (ref 14–18)
HIV 1+2 AB+HIV1 P24 AG SERPL QL IA: NEGATIVE
LDLC SERPL CALC-MCNC: 149 MG/DL
LYMPHOCYTES # BLD AUTO: 2.3 THOU/UL (ref 0.8–4.4)
LYMPHOCYTES NFR BLD AUTO: 31 % (ref 20–40)
MCH RBC QN AUTO: 32.9 PG (ref 27–34)
MCHC RBC AUTO-ENTMCNC: 34.5 G/DL (ref 32–36)
MCV RBC AUTO: 95 FL (ref 80–100)
MONOCYTES # BLD AUTO: 0.5 THOU/UL (ref 0–0.9)
MONOCYTES NFR BLD AUTO: 7 % (ref 2–10)
NEUTROPHILS # BLD AUTO: 4.3 THOU/UL (ref 2–7.7)
NEUTROPHILS NFR BLD AUTO: 59 % (ref 50–70)
PLATELET # BLD AUTO: 265 THOU/UL (ref 140–440)
PMV BLD AUTO: 8.1 FL (ref 7–10)
POTASSIUM BLD-SCNC: 4.1 MMOL/L (ref 3.5–5)
PROT SERPL-MCNC: 7.5 G/DL (ref 6–8)
RBC # BLD AUTO: 5.28 MILL/UL (ref 4.4–6.2)
SODIUM SERPL-SCNC: 140 MMOL/L (ref 136–145)
TRIGL SERPL-MCNC: 198 MG/DL
WBC: 7.4 THOU/UL (ref 4–11)

## 2019-08-12 ASSESSMENT — MIFFLIN-ST. JEOR: SCORE: 1968.91

## 2019-08-13 ENCOUNTER — OFFICE VISIT - HEALTHEAST (OUTPATIENT)
Dept: UROLOGY | Facility: CLINIC | Age: 54
End: 2019-08-13

## 2019-08-13 DIAGNOSIS — N20.0 CALCULUS OF KIDNEY: ICD-10-CM

## 2019-08-13 DIAGNOSIS — M54.9 BACK PAIN: ICD-10-CM

## 2019-08-13 LAB
ALBUMIN UR-MCNC: ABNORMAL MG/DL
APPEARANCE UR: CLEAR
BILIRUB UR QL STRIP: NEGATIVE
COLOR UR AUTO: YELLOW
GLUCOSE UR STRIP-MCNC: NEGATIVE MG/DL
HGB UR QL STRIP: NEGATIVE
KETONES UR STRIP-MCNC: ABNORMAL MG/DL
LEUKOCYTE ESTERASE UR QL STRIP: NEGATIVE
NITRATE UR QL: NEGATIVE
PH UR STRIP: 5.5 [PH] (ref 5–8)
SP GR UR STRIP: 1.02 (ref 1–1.03)
UROBILINOGEN UR STRIP-ACNC: ABNORMAL

## 2019-08-16 ENCOUNTER — COMMUNICATION - HEALTHEAST (OUTPATIENT)
Dept: UROLOGY | Facility: CLINIC | Age: 54
End: 2019-08-16

## 2019-08-16 ENCOUNTER — SURGERY - HEALTHEAST (OUTPATIENT)
Dept: UROLOGY | Facility: CLINIC | Age: 54
End: 2019-08-16

## 2019-08-16 ENCOUNTER — COMMUNICATION - HEALTHEAST (OUTPATIENT)
Dept: FAMILY MEDICINE | Facility: CLINIC | Age: 54
End: 2019-08-16

## 2019-08-16 ENCOUNTER — SURGERY - HEALTHEAST (OUTPATIENT)
Dept: SURGERY | Facility: CLINIC | Age: 54
End: 2019-08-16

## 2019-08-16 ENCOUNTER — ANESTHESIA - HEALTHEAST (OUTPATIENT)
Dept: SURGERY | Facility: CLINIC | Age: 54
End: 2019-08-16

## 2019-08-16 ASSESSMENT — MIFFLIN-ST. JEOR: SCORE: 1991.59

## 2019-08-19 ENCOUNTER — AMBULATORY - HEALTHEAST (OUTPATIENT)
Dept: UROLOGY | Facility: CLINIC | Age: 54
End: 2019-08-19

## 2019-08-19 ENCOUNTER — COMMUNICATION - HEALTHEAST (OUTPATIENT)
Dept: UROLOGY | Facility: CLINIC | Age: 54
End: 2019-08-19

## 2019-08-19 DIAGNOSIS — N20.0 CALCULUS OF KIDNEY: ICD-10-CM

## 2019-08-19 DIAGNOSIS — Z87.442 HISTORY OF KIDNEY STONES: ICD-10-CM

## 2019-08-23 ENCOUNTER — OFFICE VISIT - HEALTHEAST (OUTPATIENT)
Dept: FAMILY MEDICINE | Facility: CLINIC | Age: 54
End: 2019-08-23

## 2019-08-23 ENCOUNTER — COMMUNICATION - HEALTHEAST (OUTPATIENT)
Dept: FAMILY MEDICINE | Facility: CLINIC | Age: 54
End: 2019-08-23

## 2019-08-23 ENCOUNTER — RECORDS - HEALTHEAST (OUTPATIENT)
Dept: ADMINISTRATIVE | Facility: OTHER | Age: 54
End: 2019-08-23

## 2019-08-23 DIAGNOSIS — N20.0 KIDNEY STONE: ICD-10-CM

## 2019-08-23 DIAGNOSIS — I25.10 CORONARY ARTERY DISEASE INVOLVING NATIVE HEART WITHOUT ANGINA PECTORIS, UNSPECIFIED VESSEL OR LESION TYPE: ICD-10-CM

## 2019-08-23 DIAGNOSIS — Z01.818 PRE-OPERATIVE GENERAL PHYSICAL EXAMINATION: ICD-10-CM

## 2019-08-23 DIAGNOSIS — G47.33 OSA (OBSTRUCTIVE SLEEP APNEA): ICD-10-CM

## 2019-08-23 DIAGNOSIS — I10 ESSENTIAL HYPERTENSION: ICD-10-CM

## 2019-08-23 LAB
ATRIAL RATE - MUSE: 76 BPM
DIASTOLIC BLOOD PRESSURE - MUSE: NORMAL MMHG
INTERPRETATION ECG - MUSE: NORMAL
P AXIS - MUSE: 55 DEGREES
PR INTERVAL - MUSE: 132 MS
QRS DURATION - MUSE: 92 MS
QT - MUSE: 376 MS
QTC - MUSE: 423 MS
R AXIS - MUSE: 47 DEGREES
SYSTOLIC BLOOD PRESSURE - MUSE: NORMAL MMHG
T AXIS - MUSE: 17 DEGREES
VENTRICULAR RATE- MUSE: 76 BPM

## 2019-08-23 ASSESSMENT — MIFFLIN-ST. JEOR: SCORE: 1982.51

## 2019-08-26 ENCOUNTER — HOSPITAL ENCOUNTER (OUTPATIENT)
Dept: INTERVENTIONAL RADIOLOGY/VASCULAR | Facility: CLINIC | Age: 54
Discharge: HOME | End: 2019-08-26
Attending: UROLOGY

## 2019-08-26 ENCOUNTER — ANESTHESIA - HEALTHEAST (OUTPATIENT)
Dept: SURGERY | Facility: CLINIC | Age: 54
End: 2019-08-26

## 2019-08-26 ENCOUNTER — SURGERY - HEALTHEAST (OUTPATIENT)
Dept: SURGERY | Facility: CLINIC | Age: 54
End: 2019-08-26

## 2019-08-26 DIAGNOSIS — N20.0 KIDNEY STONES: ICD-10-CM

## 2019-08-26 ASSESSMENT — MIFFLIN-ST. JEOR: SCORE: 1972.99

## 2019-08-28 ENCOUNTER — OFFICE VISIT - HEALTHEAST (OUTPATIENT)
Dept: FAMILY MEDICINE | Facility: CLINIC | Age: 54
End: 2019-08-28

## 2019-08-28 DIAGNOSIS — N20.0 KIDNEY STONE: ICD-10-CM

## 2019-08-28 ASSESSMENT — MIFFLIN-ST. JEOR: SCORE: 2014.27

## 2019-09-04 ENCOUNTER — AMBULATORY - HEALTHEAST (OUTPATIENT)
Dept: UROLOGY | Facility: CLINIC | Age: 54
End: 2019-09-04

## 2019-09-04 DIAGNOSIS — N20.0 CALCULUS OF KIDNEY: ICD-10-CM

## 2019-09-04 LAB
ALBUMIN UR-MCNC: ABNORMAL MG/DL
APPEARANCE UR: ABNORMAL
BILIRUB UR QL STRIP: NEGATIVE
COLOR UR AUTO: YELLOW
GLUCOSE UR STRIP-MCNC: NEGATIVE MG/DL
HGB UR QL STRIP: ABNORMAL
KETONES UR STRIP-MCNC: NEGATIVE MG/DL
LEUKOCYTE ESTERASE UR QL STRIP: ABNORMAL
NITRATE UR QL: NEGATIVE
PH UR STRIP: 6 [PH] (ref 5–8)
SP GR UR STRIP: 1.02 (ref 1–1.03)
UROBILINOGEN UR STRIP-ACNC: ABNORMAL

## 2019-09-05 LAB — BACTERIA SPEC CULT: NO GROWTH

## 2019-10-01 ENCOUNTER — RECORDS - HEALTHEAST (OUTPATIENT)
Dept: RADIOLOGY | Facility: CLINIC | Age: 54
End: 2019-10-01

## 2019-10-02 ENCOUNTER — OFFICE VISIT - HEALTHEAST (OUTPATIENT)
Dept: UROLOGY | Facility: CLINIC | Age: 54
End: 2019-10-02

## 2019-10-02 ENCOUNTER — HOSPITAL ENCOUNTER (OUTPATIENT)
Dept: CT IMAGING | Facility: CLINIC | Age: 54
Discharge: HOME | End: 2019-10-02
Attending: UROLOGY

## 2019-10-02 DIAGNOSIS — N20.0 CALCULUS OF KIDNEY: ICD-10-CM

## 2019-10-02 LAB
ALBUMIN UR-MCNC: NEGATIVE MG/DL
APPEARANCE UR: CLEAR
BILIRUB UR QL STRIP: ABNORMAL
COLOR UR AUTO: YELLOW
GLUCOSE UR STRIP-MCNC: NEGATIVE MG/DL
HGB UR QL STRIP: NEGATIVE
KETONES UR STRIP-MCNC: NEGATIVE MG/DL
LEUKOCYTE ESTERASE UR QL STRIP: NEGATIVE
NITRATE UR QL: NEGATIVE
PH UR STRIP: 6 [PH] (ref 5–8)
SP GR UR STRIP: 1.02 (ref 1–1.03)
UROBILINOGEN UR STRIP-ACNC: ABNORMAL

## 2019-12-18 ENCOUNTER — COMMUNICATION - HEALTHEAST (OUTPATIENT)
Dept: FAMILY MEDICINE | Facility: CLINIC | Age: 54
End: 2019-12-18

## 2019-12-18 DIAGNOSIS — E78.2 MIXED HYPERLIPIDEMIA: ICD-10-CM

## 2019-12-23 ENCOUNTER — COMMUNICATION - HEALTHEAST (OUTPATIENT)
Dept: FAMILY MEDICINE | Facility: CLINIC | Age: 54
End: 2019-12-23

## 2019-12-23 DIAGNOSIS — I10 ESSENTIAL HYPERTENSION WITH GOAL BLOOD PRESSURE LESS THAN 130/80: ICD-10-CM

## 2020-11-16 ENCOUNTER — COMMUNICATION - HEALTHEAST (OUTPATIENT)
Dept: FAMILY MEDICINE | Facility: CLINIC | Age: 55
End: 2020-11-16

## 2020-11-16 DIAGNOSIS — K21.9 GERD (GASTROESOPHAGEAL REFLUX DISEASE): ICD-10-CM

## 2021-05-26 VITALS — TEMPERATURE: 97.8 F | HEART RATE: 58 BPM | SYSTOLIC BLOOD PRESSURE: 141 MMHG | DIASTOLIC BLOOD PRESSURE: 77 MMHG

## 2021-05-26 VITALS — TEMPERATURE: 97.6 F | DIASTOLIC BLOOD PRESSURE: 79 MMHG | SYSTOLIC BLOOD PRESSURE: 138 MMHG | HEART RATE: 66 BPM

## 2021-05-26 NOTE — TELEPHONE ENCOUNTER
Refill Approved    Rx renewed per Medication Renewal Policy. Medication was last renewed on 2/13/18.    Tiffanie Guillen, Care Connection Triage/Med Refill 3/23/2019     Requested Prescriptions   Pending Prescriptions Disp Refills     losartan (COZAAR) 100 MG tablet [Pharmacy Med Name: LOSARTAN 100MG   TAB] 90 tablet 3     Sig: TAKE 1 TABLET BY MOUTH ONCE DAILY    Angiotensin Receptor Blocker Protocol Passed - 3/23/2019  3:57 PM       Passed - PCP or prescribing provider visit in past 12 months      Last office visit with prescriber/PCP: 12/3/2018 Mary Maya MD OR same dept: 12/3/2018 Mary Maya MD OR same specialty: 12/3/2018 Mary Maya MD  Last physical: 8/29/2018 Last MTM visit: Visit date not found   Next visit within 3 mo: Visit date not found  Next physical within 3 mo: Visit date not found  Prescriber OR PCP: Mary Maya MD  Last diagnosis associated with med order: 1. Essential hypertension with goal blood pressure less than 130/80  - losartan (COZAAR) 100 MG tablet [Pharmacy Med Name: LOSARTAN 100MG   TAB]; TAKE 1 TABLET BY MOUTH ONCE DAILY  Dispense: 90 tablet; Refill: 3    If protocol passes may refill for 12 months if within 3 months of last provider visit (or a total of 15 months).            Passed - Serum potassium within the past 12 months    Lab Results   Component Value Date    Potassium 4.3 12/03/2018            Passed - Blood pressure filed in past 12 months    BP Readings from Last 1 Encounters:   12/03/18 128/80            Passed - Serum creatinine within the past 12 months    Creatinine   Date Value Ref Range Status   12/03/2018 0.80 0.70 - 1.30 mg/dL Final

## 2021-05-26 NOTE — TELEPHONE ENCOUNTER
Refill Approved    Rx renewed per Medication Renewal Policy. Medication was last renewed on 8.29.18.    Katharine Montejo, Care Connection Triage/Med Refill 3/22/2019     Requested Prescriptions   Pending Prescriptions Disp Refills     pantoprazole (PROTONIX) 40 MG tablet [Pharmacy Med Name: PANTOPRAZOLE SOD 40MG TAB] 90 tablet 3     Sig: TAKE 1 TABLET BY MOUTH ONCE DAILY    GI Medications Refill Protocol Passed - 3/20/2019  5:30 AM       Passed - PCP or prescribing provider visit in last 12 or next 3 months.    Last office visit with prescriber/PCP: 12/3/2018 Mary Maya MD OR same dept: 12/3/2018 Mary Maya MD OR same specialty: 12/3/2018 Mary Maya MD  Last physical: 8/29/2018 Last MTM visit: Visit date not found   Next visit within 3 mo: Visit date not found  Next physical within 3 mo: Visit date not found  Prescriber OR PCP: Mary Maya MD  Last diagnosis associated with med order: There are no diagnoses linked to this encounter.  If protocol passes may refill for 12 months if within 3 months of last provider visit (or a total of 15 months).

## 2021-05-28 NOTE — TELEPHONE ENCOUNTER
Refill Approved    Rx renewed per Medication Renewal Policy. Medication was last renewed on 2/13/18.    Kamala Santa, Care Connection Triage/Med Refill 4/30/2019     Requested Prescriptions   Pending Prescriptions Disp Refills     potassium chloride (MICRO-K) 10 mEq CR capsule [Pharmacy Med Name: POTA CHLORIDE 10MEQ ER CAP] 90 capsule 3     Sig: TAKE 1 CAPSULE BY MOUTH ONCE DAILY       Potassium Supplements Refill Protocol Passed - 4/30/2019  5:30 AM        Passed - PCP or prescribing provider visit in past 12 months       Last office visit with prescriber/PCP: 12/3/2018 Mary Maya MD OR same dept: 12/3/2018 Mary Maya MD OR same specialty: 12/3/2018 Mary Maya MD  Last physical: 8/29/2018 Last MTM visit: Visit date not found   Next visit within 3 mo: Visit date not found  Next physical within 3 mo: Visit date not found  Prescriber OR PCP: Mary Maya MD  Last diagnosis associated with med order: 1. Essential hypertension with goal blood pressure less than 130/80  - potassium chloride (MICRO-K) 10 mEq CR capsule [Pharmacy Med Name: POTA CHLORIDE 10MEQ ER CAP]; TAKE 1 CAPSULE BY MOUTH ONCE DAILY  Dispense: 90 capsule; Refill: 3    If protocol passes may refill for 12 months if within 3 months of last provider visit (or a total of 15 months).             Passed - Potassium level in last 12 months     Lab Results   Component Value Date    Potassium 4.3 12/03/2018             pravastatin (PRAVACHOL) 10 MG tablet [Pharmacy Med Name: PRAVASTATIN 10MG    TAB] 90 tablet 3     Sig: TAKE 1 TABLET BY MOUTH ONCE DAILY       Statins Refill Protocol (Hmg CoA Reductase Inhibitors) Passed - 4/30/2019  5:30 AM        Passed - PCP or prescribing provider visit in past 12 months      Last office visit with prescriber/PCP: 12/3/2018 Mary Maya MD OR same dept: 12/3/2018 Mary Maya MD OR same specialty: 12/3/2018 Mary Maya MD  Last physical: 8/29/2018 Last MTM visit: Visit date not found    Next visit within 3 mo: Visit date not found  Next physical within 3 mo: Visit date not found  Prescriber OR PCP: Mary Maya MD  Last diagnosis associated with med order: 1. Essential hypertension with goal blood pressure less than 130/80  - potassium chloride (MICRO-K) 10 mEq CR capsule [Pharmacy Med Name: POTA CHLORIDE 10MEQ ER CAP]; TAKE 1 CAPSULE BY MOUTH ONCE DAILY  Dispense: 90 capsule; Refill: 3    If protocol passes may refill for 12 months if within 3 months of last provider visit (or a total of 15 months).             clopidogrel (PLAVIX) 75 mg tablet [Pharmacy Med Name: CLOPIDOGREL 75MG    TAB] 90 tablet 3     Sig: TAKE 1 TABLET BY MOUTH ONCE DAILY       Clopidogrel/Prasugrel/Ticagrelor Refill Protocol Passed - 4/30/2019  5:30 AM        Passed - PCP or prescribing provider visit in past 6 months       Last office visit with prescriber/PCP: 12/3/2018 OR same dept: 12/3/2018 Mary Maya MD OR same specialty: 12/3/2018 Mary Maya MD Last physical: Visit date not found Last MTM visit: Visit date not found     Next appt within 3 mo: Visit date not found  Next physical within 3 mo: Visit date not found  Prescriber OR PCP: Mary Maya MD  Last diagnosis associated with med order: 1. Essential hypertension with goal blood pressure less than 130/80  - potassium chloride (MICRO-K) 10 mEq CR capsule [Pharmacy Med Name: POTA CHLORIDE 10MEQ ER CAP]; TAKE 1 CAPSULE BY MOUTH ONCE DAILY  Dispense: 90 capsule; Refill: 3    If protocol passes may refill for 6 months if within 3 months of last provider visit (or a total of 9 months).              Passed - Hemoglobin in past 12 months     Hemoglobin   Date Value Ref Range Status   08/29/2018 15.6 14.0 - 18.0 g/dL Final

## 2021-05-30 VITALS — WEIGHT: 257 LBS | BODY MASS INDEX: 35.98 KG/M2 | HEIGHT: 71 IN

## 2021-05-30 NOTE — TELEPHONE ENCOUNTER
Refill Approved    Rx renewed per Medication Renewal Policy. Medication was last renewed on 6/6/18 (hctz) 5/12/18 (metoprolol).    Deb Tate, [facility] Triage/Med Refill 7/26/2019     Requested Prescriptions   Pending Prescriptions Disp Refills     hydroCHLOROthiazide (HYDRODIURIL) 50 MG tablet [Pharmacy Med Name: HYDROCHLOROT 50MG   TAB] 90 tablet 2     Sig: TAKE 1 TABLET BY MOUTH ONCE DAILY       Diuretics/Combination Diuretics Refill Protocol  Passed - 7/26/2019  1:24 PM        Passed - Visit with PCP or prescribing provider visit in past 12 months     Last office visit with prescriber/PCP: 12/3/2018 Mary Maya MD OR same dept: 12/3/2018 Mary Maya MD OR same specialty: 12/3/2018 Mary Maya MD  Last physical: 8/29/2018 Last MTM visit: Visit date not found   Next visit within 3 mo: Visit date not found  Next physical within 3 mo: Visit date not found  Prescriber OR PCP: Mary Maya MD  Last diagnosis associated with med order: 1. Essential hypertension with goal blood pressure less than 130/80  - hydroCHLOROthiazide (HYDRODIURIL) 50 MG tablet [Pharmacy Med Name: HYDROCHLOROT 50MG   TAB]; TAKE 1 TABLET BY MOUTH ONCE DAILY  Dispense: 90 tablet; Refill: 2    2. Hypertension  - metoprolol succinate (TOPROL-XL) 25 MG [Pharmacy Med Name: METOPROLOL ER 25MG  TAB]; TAKE 1 TABLET BY MOUTH ONCE DAILY  Dispense: 60 tablet; Refill: 5    If protocol passes may refill for 12 months if within 3 months of last provider visit (or a total of 15 months).             Passed - Serum Potassium in past 12 months      Lab Results   Component Value Date    Potassium 4.3 12/03/2018             Passed - Serum Sodium in past 12 months      Lab Results   Component Value Date    Sodium 141 12/03/2018             Passed - Blood pressure on file in past 12 months     BP Readings from Last 1 Encounters:   12/03/18 128/80             Passed - Serum Creatinine in past 12 months      Creatinine   Date Value Ref  Range Status   12/03/2018 0.80 0.70 - 1.30 mg/dL Final             metoprolol succinate (TOPROL-XL) 25 MG [Pharmacy Med Name: METOPROLOL ER 25MG  TAB] 60 tablet 5     Sig: TAKE 1 TABLET BY MOUTH ONCE DAILY       Beta-Blockers Refill Protocol Passed - 7/26/2019  1:24 PM        Passed - PCP or prescribing provider visit in past 12 months or next 3 months     Last office visit with prescriber/PCP: 12/3/2018 Mary Maya MD OR same dept: 12/3/2018 Mary Maya MD OR same specialty: 12/3/2018 Mary Maya MD  Last physical: 8/29/2018 Last MTM visit: Visit date not found   Next visit within 3 mo: Visit date not found  Next physical within 3 mo: Visit date not found  Prescriber OR PCP: Mary Maya MD  Last diagnosis associated with med order: 1. Essential hypertension with goal blood pressure less than 130/80  - hydroCHLOROthiazide (HYDRODIURIL) 50 MG tablet [Pharmacy Med Name: HYDROCHLOROT 50MG   TAB]; TAKE 1 TABLET BY MOUTH ONCE DAILY  Dispense: 90 tablet; Refill: 2    2. Hypertension  - metoprolol succinate (TOPROL-XL) 25 MG [Pharmacy Med Name: METOPROLOL ER 25MG  TAB]; TAKE 1 TABLET BY MOUTH ONCE DAILY  Dispense: 60 tablet; Refill: 5    If protocol passes may refill for 12 months if within 3 months of last provider visit (or a total of 15 months).             Passed - Blood pressure filed in past 12 months     BP Readings from Last 1 Encounters:   12/03/18 128/80

## 2021-05-31 VITALS — BODY MASS INDEX: 34.3 KG/M2 | HEIGHT: 71 IN | WEIGHT: 245 LBS

## 2021-05-31 VITALS — WEIGHT: 248.5 LBS | BODY MASS INDEX: 34.66 KG/M2

## 2021-05-31 NOTE — PATIENT INSTRUCTIONS - HE
I will get your paperwork faxed to your surgeon by the end of the day today.    Nothing to eat or drink after midnight    You can take your metoprolol the morning of surgery.    No advil, ibuprofen, aleve, naproxen, or aspirin a week before surgery. No fish oil, vitamin E, or ginko 2 weeks prior to surgery. Don't start any new supplements    Your surgeon will manage your pain after your procedure.      Thank you for coming in today!    If you receive a survey from [employer] about your experience today, it would be very helpful if you could fill it out to let us know what went well and what we can improve!    General Information:    Today you had your appointment with Long Lundberg NP    My hours are:    Monday : Out of clinic  Tuesday : 8:00AM - 5:00 PM  Wednesday: 8:00AM - 5:00 PM  Thursday: 8:00AM - 5:00 PM  Friday: 8:00AM - 5:00 PM    I am not in the office Mondays. Therefore non-urgent calls and medical messages received on Monday will be addressed when I am back in the office on Tuesday. Urgent matters will be reviewed and addressed by one of my partners in the office as needed.    If lab work was done today as part of your evaluation you will generally be contacted via [employer], mail, or phone with the results within 1-5 days. If there is an alarming result we will contact you by phone. Lab results come back at varying times, I generally wait until all lab results are available before making comments on the results.     If you need refills please contact your pharmacy. They will send a refill request to me to review. Please allow 3-5 business days for us to process all refill requests.     My Clinical Assistant is Kathryn. Please call us at [phone] or send a medical message with any questions or concerns.

## 2021-05-31 NOTE — TELEPHONE ENCOUNTER
Yes, He can stop the Plavix and restart after all is done.  (He has been on the medication over one year, so this is fine.)

## 2021-05-31 NOTE — PATIENT INSTRUCTIONS - HE
Patient Stated Goal: Know what to expect after surgery  Ureteroscopy    Ureteroscopy is a procedure which is done for clearance of stones from the ureter, kidney or both. There are no incisions involved. The procedure involves your surgeon placing a small scope into your urethra. This is the opening where urine leaves your body.  The surgeon watches as they carefully guide the scope to the stone(s).  Modern flexible ureteroscopes can be used to reach virtually any location within the urinary tract.     The size, shape and location of the stone determines how best to treat the stone(s).  Whenever possible, stones are removed in one piece.  Larger stones need to be broken using a laser before removing in smaller pieces.  The goal is to remove all stones and stone fragments from that side of the body in a single treatment.  Complete stone clearance is an important step to prevent future kidney stone episodes.    Surgery:    Same day outpatient procedure    30-60 minutes    Procedure done in hospital surgical suite    General anesthesia (you will be asleep during the procedure)     Antibiotic prior to surgery to prevent infection    Physician will visit with you and respond to any questions or concerns and consent will be signed prior to going to the operating room    Risks:    Infection - Preoperative antibiotics should prevent new infections but it is possible that unanticipated bacteria may be introduced at time of surgery or that the stones were actually chronically infected before surgery      Injury - The ureter may be injured during this procedure.  This is most likely to happen if the ureter was very inflamed before surgery or if a stone is very tightly impacted.  The surgeon will not aggressively treat a stone if this creates a risk of injury.        Inaccessible Stones -A single procedure is effective in 95% of cases, but if your ureter is very narrow or your kidney stone is very impacted, a stent will be  placed and the procedure stopped.  In 1-2 weeks after the ureter has relaxed, the patient will be brought back to surgery and the procedure can be safely performed.      Incomplete stone clearance -Occasionally stone or stone fragments may not be completely cleared.  These may pass on their own, which may cause discomfort.  Our goal is to remove all possible stones and fragments.    Stent:      An internal soft tube will be placed between the kidney and the bladder while in surgery (after the stone is cleared). The stent will keep the kidney draining.    What should I expect?     It is common for a stent to cause some irritation and discomfort.   You may have:      The need to urinate suddenly     The need to urinate often     Pain during urination     A dull backache, which may get worse during urination     Blood stained urine (like fruit punch) and occasional small clots    It s important to remember the stent is necessary and only temporary. To feel more comfortable:      Drink more than you normally would but you do not have to constantly  flush your kidneys     Limiting your activity may decrease irritation or bleeding    Ibuprofen - 2 tablets every 6-8 hours     Use pain medications as directed.    When is the stent removed?    Most stents are removed within 5 days to 2 weeks after a procedure.     How is the stent removed?     Your stent will be removed in [facility] with a small telescope and a grasping tool.  It usually takes less than 1 minute to remove the stent.    What should I expect after the stent is removed?     You should feel normal by the next day    Some patients find:    An increase in back pain about an hour after the stent is removed as the kidney fills up with urine before it starts to empty.  It can be as uncomfortable as your initial stone episode.  Taking pain medications before stent removal may be helpful, but you would need someone else to drive you to and from your  appointment.    Bladder symptoms usually disappear by the next morning.    Small amounts of blood in the urine may be seen occasionally for up to a week.    Diet:      After surgery, there are no dietary restrictions - Drink to thirst, there is no need to increase intake of fluids, as this may increase nausea symptoms. Try to eat smaller, more frequent snacks, instead of large meals.    Activity:    Many people return to work within 1-2 days. Fatigue is normal for a couple of weeks following surgery. With increased activity you may experience more discomfort and you may notice more blood in your urine.      Post-Operative Symptom Control    While you recover from your procedure, you can take steps to ease your recovery.    Medications that prevent further episodes of severe pain and help stones pass: Take these as prescribed on a regular basis even if you are NOT in pain      Ibuprofen (Advil or Motrin) - Is available over the counter Take 2 (200mg) tablets every 6 hours until the stone passes.  o prevents spasm of the ureter.    o Decreases pain      Dramamine - (drowsy version, non-generic formulation) Is available over the counter and decreases spasm of the ureter.  Take 50mg at bedtime every night until the stone passes. In addition, take every 6 hours as needed.  Dramamine:  o Decreases nausea  o Decreases acute pain  o Decreases recurrence of pain for next 24 hours  o Will help you sleep        *This medication will cause increased drowsiness, do not drive or operate machinery for 6 hours      Flomax- Studies show that Flomax decreases irritation from stents.   o Take every day with food until stone passes even if you do not have pain  o Flomax does not relieve pain.        *This medication may cause nasal congestion or light-headedness      Detrol ( Tolterodine) - After surgery Detrol may decrease stent irritation and pelvic pain  o Take as prescribed     *This medication may cause dry mouth, constipation or  blurry vision. Stop medication if unable to urinate.    Medication that are taken as needed to manage break through symptoms: Take these ONLY as required and hopefully not at all      Narcotics (Percocet, Vicodin, Dilaudid)- take as prescribed for severe pain unrelieved by ibuprofen and dramamine  o Take as prescribed for severe pain  o Narcotics have significant side effects and only  cover-up  pain. They have no effect on cause of pain.  o Common side effects:  - Confusion, disorientation and sedation - DO NOT DRIVE OR OPERATE MACHINERY WITHIN 24 HOURS  - Nausea - take Dramamine or Zofran  or Haldol to help control  - Constipation  - Sleep disturbances      Ondansetron (Zofran)-  o Take as prescribed  o Reserve for severe nausea  o May cause constipation, start over the counter Miralax if needed to treat this    Haldol-  o Take as prescribed  o Reserve for severe nausea    Warning Signs/Symptoms - Please call the [facility] 24 hours a day at [phone] IMMEDIATELY if you experience any of these:    Fever greater than 100.1     Chills    Pain NOT CONTROLLED by pain medications    Heavy bleeding or large clots in urine (small clots can be normal)    Persistent nausea and/or vomiting    Post-Operative Follow up:    The stone(s) will be sent from surgery to a lab for composition analysis.  These results are usually available before a one month post-operative visit.  If you had laser treatment to break up your stone, you will usually be scheduled for a low dose CT scan prior to your one month appointment.  This scan allows your surgeon to confirm that all stone fragments were cleared at time of surgery and that there have been no complications.  These results along with possible labs and urine studies will help us develop an individualized plan to prevent new stones from forming and keep existing stones from enlarging.  This visit is usually scheduled about 1 month after your original surgery.    The  [facility] can respond to your questions or concerns 24 hours a day at [phone].

## 2021-05-31 NOTE — PROGRESS NOTES
PROGRESS NOTE       SUBJECTIVE:  Osbaldo Garcia is a 54 y.o. male   Chief Complaint   Patient presents with     Medication Refill     med check and refills    Osbaldo is here today to update me on his problems with right flank pain.  He saw Dr. Pineda here in the clinic August 8.  At that time he had had right flank pain for a week.  He had no gross hematuria, no fever, just pain.  He recalls having had a procedure for stone removal in the past but does not recall when.  Our notes say he is status post ureteroscopy.  On August 8 he underwent a CT scan ordered by Dr. Pineda which shows a millimeter stone on the right.  It was nonobstructing at the time.  Patient states that since that time he continues with right flank pain.  It has not been as severe as it was before, however.  He supposed to go back to work tomorrow but he does not think he can do it.  He has not been taking anything for pain.  He has been basically bending his time on the couch.  He tries to drink water evenly throughout the day.  His job is not the best for someone who has stones.  He works in a extremely hot environment and sweats a lot.  He works really hard at staying hydrated.  Otherwise he has been well.  He continues to have no fever and no dysuria.  He has not noted gross blood in his urine.      Patient Active Problem List   Diagnosis     Hyperlipidemia     Acute Myocarditis     Hypertension     Abdominal Pain     Shortness Of Breath     MARY (obstructive sleep apnea)     Hematuria, microscopic     Basal cell carcinoma of neck     CAD (coronary artery disease)       Current Outpatient Medications   Medication Sig Dispense Refill     ascorbic acid (VITAMIN C) 1000 MG tablet Take 1,000 mg by mouth daily.       cholecalciferol, vitamin D3, 1,000 unit tablet Take 1,000 Units by mouth 2000 IU.             clopidogrel (PLAVIX) 75 mg tablet TAKE 1 TABLET BY MOUTH ONCE DAILY 90 tablet 0     coQ10, ubiquinol, 200 mg cap Take by mouth.       garlic  500 mg cap        hydroCHLOROthiazide (HYDRODIURIL) 50 MG tablet Take 1 tablet (50 mg total) by mouth daily. 90 tablet 0     Lactobacillus acidophilus (BACID) 10 billion cell capsule Take by mouth.       losartan (COZAAR) 100 MG tablet Take 1 tablet (100 mg total) by mouth daily. 90 tablet 2     metoprolol succinate (TOPROL-XL) 25 MG Take 1 tablet (25 mg total) by mouth daily. 90 tablet 0     MULTIVIT &MINERALS/FERROUS FUM (MULTI VITAMIN ORAL) Take by mouth.       pantoprazole (PROTONIX) 40 MG tablet TAKE 1 TABLET BY MOUTH ONCE DAILY 90 tablet 1     potassium chloride (MICRO-K) 10 mEq CR capsule TAKE 1 CAPSULE BY MOUTH ONCE DAILY 90 capsule 1     pravastatin (PRAVACHOL) 10 MG tablet TAKE 1 TABLET BY MOUTH ONCE DAILY 90 tablet 1     procyan olig/ubi/vit A/Hb#155 (PYCNOGENOL COMPLEX ORAL) Take by mouth.       nitroglycerin (NITROSTAT) 0.4 MG SL tablet Place 0.4 mg under the tongue.       No current facility-administered medications for this visit.        Social History     Tobacco Use   Smoking Status Former Smoker   Smokeless Tobacco Former User     Quit date: 3/4/2014       REVIEW OF SYSTEMS:  Patient denies fever, chills, dizziness, headache, visual change, ear pain, cough, chest pain, shortness of breath, abdominal pain, extremity pain or swelling, rash,  depression or anxiety.          OBJECTIVE:       Vitals:    08/12/19 1045   BP: 128/78   Pulse: 81   SpO2: 98%     Weight: (!) 244 lb (110.7 kg)    Wt Readings from Last 3 Encounters:   08/12/19 (!) 244 lb (110.7 kg)   08/08/19 (!) 247 lb 5 oz (112.2 kg)   12/03/18 (!) 251 lb (113.9 kg)     Body mass index is 34.03 kg/m .        Physical Exam:  GENERAL APPEARANCE: A&A, NAD, well hydrated, well nourished  NECK: Supple, without lymphadenopathy, no thyroid mass  CV: RRR, no M/G/R   LUNGS: CTAB, normal respiratory effort  ABDOMEN: S&NT, no masses, no organomegaly  No specific right flank pain elicited on percussion.  He points to the area where he feels a dull pain  in the right.  EXTREMITY: Extremities normal, atraumatic, no swelling  NEURO: no gross deficits   PSYCHIATRIC:  Mood appropriate, memory intact        ASSESSMENT/PLAN:     1. Ureteral stone  I recommend he see urology.  He is very likely to require assistance in managing this stone.  Result Impression   IMPRESSION:  1. Nonobstructing right renal collecting system stones. No  hydronephrosis. No left urinary tract calculi or hydronephrosis.  2. Abdominal and pelvic organs are otherwise within normal limits.  Bladder is unremarkable.    MANUEL VILLAREAL MD       - Ambulatory referral to Urology    2. Coronary artery disease involving native coronary artery of native heart without angina pectoris  Plavix is refilled and lab work is ordered.  - clopidogrel (PLAVIX) 75 mg tablet; Take 1 tablet (75 mg total) by mouth daily.  Dispense: 90 tablet; Refill: 3  - Lipid Cascade  - HM1(CBC and Differential)  - Comprehensive Metabolic Panel  - HM1 (CBC with Diff)    3. Essential hypertension with goal blood pressure less than 130/80  Continue hydrochlorothiazide for blood pressure management  - hydroCHLOROthiazide (HYDRODIURIL) 50 MG tablet; Take 1 tablet (50 mg total) by mouth daily.  Dispense: 90 tablet; Refill: 3    4. GERD (gastroesophageal reflux disease)  He should continue the Protonix at this time.  - pantoprazole (PROTONIX) 40 MG tablet; Take 1 tablet (40 mg total) by mouth daily.  Dispense: 90 tablet; Refill: 2    5. Hypertension  Metoprolol is refilled  - metoprolol succinate (TOPROL-XL) 25 MG; Take 1 tablet (25 mg total) by mouth daily.  Dispense: 90 tablet; Refill: 3    6. Screen for STD (sexually transmitted disease)  Patient requested screening for STDs.  He has not been sexually active for a long time but wants to be sure he is cleared.  He has no current symptoms.  - HIV Antigen/Antibody Diagnostic Cascade  - Chlamydia trachomatis & Neisseria gonorrhoeae, Amplified Detection; Future    7. Rash  Patient has a  nonspecific rash on the back of his right leg.  He will apply triamcinolone ointment until clear.  - triamcinolone (KENALOG) 0.5 % ointment; Apply sparingly to rash on leg twice daily until clear.  Mary Maya MD  Dispense: 30 g; Refill: 0        I spent a total of 28 minutes face to face with the patient.  Over 50% of the time spent counseling and educating the patient about all of the above.      Mary Maya MD

## 2021-05-31 NOTE — TELEPHONE ENCOUNTER
Mr. Garcia left a urine specimen for Chlamydia trachomatis & Neisseria gonorrhoeae, Amplified Detection, but went over the collection line, and therefore the specimen in unusable.    Patient will need to make an appointment for recollection.     A message for a return call was left at [phone].

## 2021-05-31 NOTE — TELEPHONE ENCOUNTER
Patient is post ureteroscopy stent insertion today.  The initial plan of stone removal was unsuccessful due to a ureteral stricture.  Next plan is to have definitive stone removal in one week.  Patient does understand that the surgery was not successful due to a ureteral stricture.  He just wanted to know when the next surgery date and time is.  He does have a pre-op physical appointment with his pcp office on the 28th and hoping that he does not have to wait that long for the next surgery.  Will clarify next surgery date plan with Dr. Salinas.  If indeed it is one week, patient may need to move up the pre-op appointment.  Patient understood and will wait for call back on Monday with details.  Post op wise he is tolerating the stent well with general complaints of dysuria.  Patient to call over the weekend should he develop severe pain or fevers.  Patient understood and agree with recommendations.

## 2021-05-31 NOTE — ANESTHESIA CARE TRANSFER NOTE
Last vitals:   Vitals:    08/26/19 1235   BP: 136/75   Pulse: 85   Resp: 12   Temp: 36.1  C (97  F)   SpO2: 100%     Patient's level of consciousness is drowsy  Spontaneous respirations: yes  Maintains airway independently: yes  Dentition unchanged: yes  Oropharynx: oropharynx clear of all foreign objects    QCDR Measures:  ASA# 20 - Surgical Safety Checklist: WHO surgical safety checklist completed prior to induction    PQRS# 430 - Adult PONV Prevention: 4558F - Pt received => 2 anti-emetic agents (different classes) preop & intraop  ASA# 8 - Peds PONV Prevention: NA - Not pediatric patient, not GA or 2 or more risk factors NOT present  PQRS# 424 - Jaymie-op Temp Management: 4559F - At least one body temp DOCUMENTED => 35.5C or 95.9F within required timeframe  PQRS# 426 - PACU Transfer Protocol: - Transfer of care checklist used  ASA# 14 - Acute Post-op Pain: ASA14B - Patient did NOT experience pain >= 7 out of 10

## 2021-05-31 NOTE — TELEPHONE ENCOUNTER
Pt contacted. He had a stent placed today regarding a kidney stone. He is very confused due to the medication that he was given for procedure. He is asking how long he has to wait until the stent is out or what the plan is for a pre op exam. Pt was asked to call the surgeon's office and confirm with them Monday morning what his plan is and what needs to be scheduled.Sabrina, [facility]      [facility] contacted. The post op nurses will call him next week and confirm what exactly is needing to be done and what the time line is. Pre op was already scheduled 8/28/19 and this will be discuss as well during post op call. No need to call back today.

## 2021-05-31 NOTE — TELEPHONE ENCOUNTER
Patient requesting a refill of pain medication.  He states that the oxycodone only makes him itchy and doesn't help the pain.  He would like to try something different.  Elaine Barber RN

## 2021-05-31 NOTE — PROGRESS NOTES
Patient presents to the office today for a kidney stone consultation referral from Dr. Mary Maya at [facility].

## 2021-05-31 NOTE — ANESTHESIA POSTPROCEDURE EVALUATION
Patient: Osbaldo Garcia  RIGHT CYSTOSCOPY STENT REMOVAL/RIGHT  PERCUTANEOUS NEPHROLITHOTOMY/RIGHT STENT INSERTION  Anesthesia type: general    Patient location: PACU  Last vitals:   Vitals Value Taken Time   /72 8/26/2019  1:00 PM   Temp 36.1  C (97  F) 8/26/2019  1:00 PM   Pulse 74 8/26/2019  1:13 PM   Resp 22 8/26/2019  1:13 PM   SpO2 99 % 8/26/2019  1:13 PM   Vitals shown include unvalidated device data.  Post vital signs: stable  Level of consciousness: awake and responds to simple questions  Post-anesthesia pain: pain controlled  Post-anesthesia nausea and vomiting: no  Pulmonary: unassisted, nasal cannula  Cardiovascular: stable and blood pressure at baseline  Hydration: adequate  Anesthetic events: no    QCDR Measures:  ASA# 11 - Jaymie-op Cardiac Arrest: ASA11B - Patient did NOT experience unanticipated cardiac arrest  ASA# 12 - Jaymie-op Mortality Rate: ASA12B - Patient did NOT die  ASA# 13 - PACU Re-Intubation Rate: ASA13B - Patient did NOT require a new airway mgmt  ASA# 10 - Composite Anes Safety: ASA10A - No serious adverse event    Additional Notes:

## 2021-05-31 NOTE — TELEPHONE ENCOUNTER
[facility] Could we use out own h.e. Kidney stone inst.?  And pt has a question- has a rash on the back of his leg- it is itchy and [facility]- it has been there 2 weeks  Uses: [facility] [place]

## 2021-05-31 NOTE — TELEPHONE ENCOUNTER
"\"I am having pain in my kidney area, but I am not sure if it is my kidneys or maybe my gall bladder\". Hx of kidney stones years ago. He states he has had kidney pain on and off for the last couple of months. Usually goes away after drinking water. Right now he does not have pain, but he states he hasn't moved much for the last 4-5 days. Whenever he eats his stomach gets upset: nauseated.   He has had chills, not recently. Has not checked his temperature, no thermometer. When he is up for very long the pain gets bad = \"3-5\".=\"moderate\". He has not taken pain medication. He has taken AZO in the past, 2. It did not help.     Reason for Disposition    MODERATE pain (e.g., interferes with normal activities or awakens from sleep)    Protocols used: FLANK PAIN-A-AH    Triaged to a disposition of See provider within 24 hrs.   Call transferred to .    Odette Brumfield RN [facility] Triage Nurse  "

## 2021-05-31 NOTE — PROGRESS NOTES
"Chief Complaint   Patient presents with     Back Pain     fasting, Pt c/o R side back pain by kidney x one week. Pt woke last night with Kidney pain. He took AZO 2 days ago.        HPI: Presents today with \"kidney pain\" for 2 days in duration that woke him up from sleep.  When I asked him more about the pain he states, \"well I have had this for a long time\".  He is a poor historian.  He denies any hematuria.  No history of trauma.  Review of old notes from 7/13/2017 shows that he had a small amount of hematuria at that time and trace protein.    ROS: No gross hematuria.  No abdominal pain.  No nausea vomiting or fever.  10 point system review notable for MARY, hypertension, acute myocarditis and hyperlipidemia.    SH:    reports that he has quit smoking. He quit smokeless tobacco use about 5 years ago. He reports that he drinks alcohol. He reports that he does not use drugs.      FH: The Patient's family history includes Coronary artery disease in his father; Hypertension in his sister.     Meds:  Osbaldo has a current medication list which includes the following prescription(s): ascorbic acid (vitamin c), cholecalciferol (vitamin d3), clopidogrel, garlic, hydrochlorothiazide, lactobacillus acidophilus, losartan, metoprolol succinate, multivit-min/ferrous fumarate, nitroglycerin, pantoprazole, potassium chloride, and pravastatin.    O:  /60   Pulse 68   Resp 16   Wt (!) 247 lb 5 oz (112.2 kg)   SpO2 96%   BMI 34.49 kg/m    Alert conversant no acute distress  Skin pink and dry  No pain to palpation of the CVA area bilaterally.  No abdominal pain to palpation.  Back has normal range of motion he is able to flex and extend without difficulty    UA strongly positive for blood    A/P:   1. Pain  - Urinalysis Macroscopic    2. Microscopic hematuria  The patient has hematuria, perceived pain, and this is been going on for quite some time.  Cannot exclude malignant neoplasm, kidney stone, or intraparenchymal renal " disease.  Because this could represent renal stone we will have him go directly to [facility] today for CT scan of of the abdomen.  - CT Abdomen Pelvis Without Oral Without IV Contrast; Future

## 2021-05-31 NOTE — TELEPHONE ENCOUNTER
[facility]. [place]. Completed CT scan. This is being faxed to us this afternoon.   H.DeGree, CMA

## 2021-05-31 NOTE — ANESTHESIA PREPROCEDURE EVALUATION
Anesthesia Evaluation      Patient summary reviewed   No history of anesthetic complications     Airway   Mallampati: II   Pulmonary - normal exam   (+) shortness of breath, sleep apnea on CPAP, ,   Smoker: quit Aril 2019.                         Cardiovascular - normal exam  Exercise tolerance: > or = 4 METS  (+) hypertension, CAD, ,     ECG reviewed (SB)  Rhythm: regular  Rate: normal,      ROS comment: 11/3/14 TTE  CONCLUSION:    Normal LV size and systolic function. EF >/= 55%. Mid-lateral     hypokinesis.    Normal RV function.    No gross pericardial effusion.    Normal IVC.    Intact IAS (with negative bubble study and color Doppler for     shunting).      Neuro/Psych - negative ROS     Endo/Other - negative ROS      GI/Hepatic/Renal    (+)   chronic renal disease,      Other findings: Results for QIAN GILBERT (MRN [id number]) as of 8/16/2019 07:51    8/12/2019 11:40  Sodium: 140  Potassium: 4.1  Chloride: 102  CO2: 28  Anion Gap, Calculation: 10  BUN: 25 (H)  Creatinine: 1.05  GFR MDRD Af Amer: >60  GFR MDRD Non Af Amer: >60  Results for QIAN GILBERT (MRN [id number]) as of 8/16/2019 07:51    8/12/2019 11:40  WBC: 7.4  RBC: 5.28  Hemoglobin: 17.4  Hematocrit: 50.4  MCV: 95  MCH: 32.9  MCHC: 34.5  RDW: 11.6  Platelets: 265        Dental - normal exam                        Anesthesia Plan  Planned anesthetic: general LMA    ASA 2   Induction: intravenous   Anesthetic plan and risks discussed with: patient  Anesthesia plan special considerations: antiemetics,   Post-op plan: routine recovery

## 2021-05-31 NOTE — TELEPHONE ENCOUNTER
Test Results  Who is calling?:  Patient   Who ordered the test:  Michael Mary  Type of test: Other: CT Scan  Date of test:  8/8/19  Where was the test performed:  [facility]   What are your questions/concerns?:  Patient requesting for results   Okay to leave a detailed message?:  No

## 2021-05-31 NOTE — PROGRESS NOTES
Preoperative Exam    Scheduled Procedure: Surgical Removal of Right side Kidney Stone  Surgery Date:  8/26/19  Surgery Location: [facility], fax [phone]    Surgeon:  Dr. Arenas.    Assessment/Plan:     1. Pre-operative general physical examination  Cleared for surgery  - Electrocardiogram Perform and Read    2. Kidney stone  Planned cystoscopy with stent removal and insertion    3. Coronary artery disease involving native heart without angina pectoris, unspecified vessel or lesion type  Stable.  No concerns on ECG    4. MARY (obstructive sleep apnea)  Not treated.  Monitor closely for airway impingement.    5. Hypertension  Well-controlled.        Surgical Procedure Risk: Low (reported cardiac risk generally < 1%)  Have you had prior anesthesia?: Yes  Have you or any family members had a previous anesthesia reaction:  No  Do you or any family members have a history of a clotting or bleeding disorder?: No  Cardiac Risk Assessment: increased risk for major cardiac complications based on  myocardial infarction history    APPROVAL GIVEN to proceed with proposed procedure, without further diagnostic evaluation    Please Note:  hx of sleep apnea but quit CPAP    Functional Status: [facility]ially D[facility]   Patient plans to recover at home with family.     Subjective:      Osbaldo Garcia is a 54 y.o. male who presents for a preoperative consultation.  Patient was previously diagnosed with a 10 mm right nonobstructing ureteral stone.  Plan is to go in for a right cystoscopy with stent removal and percutaneous nephrolithotomy stent insertion.  Patient notes he is having continued pain issues from this.  He struggles with activity as it seems to exacerbate the pain.  He has prescriptions for oxycodone hydromorphone which he has been taking sparingly and provide mild relief.  He has been off of work because of this which has been hard for him.  Notes occasional gross  "hematuria.    Patient has a known history of coronary artery disease following a myocardial infarction.  Continues with losartan, metoprolol, and clopidogrel.  No chest pain or shortness of breath issues.  Needs an updated ECG today.  He has been holding his clopidogrel for \"about a week now\".  Blood pressure well controlled today.    Patient has also history of obstructive sleep apnea.  He says that he has tried CPAP machines in the past but was unable to tolerate them.  Currently he is untreated.    All other systems reviewed and are negative, other than those listed in the HPI.    Pertinent History  Do you have difficulty breathing or chest pain after walking up a flight of stairs: No  History of obstructive sleep apnea: Yes: untreated  Steroid use in the last 6 months: No  Frequent Aspirin/NSAID use: No  Prior Blood Transfusion: No  Prior Blood Transfusion Reaction: No  If for some reason prior to, during or after the procedure, if it is medically indicated, would you be willing to have a blood transfusion?:  There is no transfusion refusal.    Current Outpatient Medications   Medication Sig Dispense Refill     ascorbic acid (VITAMIN C) 1000 MG tablet Take 500 mg by mouth daily.              cholecalciferol, vitamin D3, 1,000 unit tablet Take 5,000 Units by mouth daily. 2000 IU             coQ10, ubiquinol, 200 mg cap Take 200 mg by mouth daily.              garlic 500 mg cap Take 1,100 mg by mouth daily.              hydroCHLOROthiazide (HYDRODIURIL) 50 MG tablet Take 1 tablet (50 mg total) by mouth daily. 90 tablet 3     HYDROmorphone (DILAUDID) 2 MG tablet Take 1 tablet (2 mg total) by mouth every 6 (six) hours as needed for pain. 12 tablet 0     Lactobacillus acidophilus (BACID) 10 billion cell capsule Take 1 capsule by mouth daily.              losartan (COZAAR) 100 MG tablet Take 1 tablet (100 mg total) by mouth daily. 90 tablet 2     metoprolol succinate (TOPROL-XL) 25 MG Take 1 tablet (25 mg total) by " mouth daily. 90 tablet 3     MULTIVIT &MINERALS/FERROUS FUM (MULTI VITAMIN ORAL) Take 1 tablet by mouth daily.              pantoprazole (PROTONIX) 40 MG tablet Take 1 tablet (40 mg total) by mouth daily. 90 tablet 2     potassium chloride (MICRO-K) 10 mEq CR capsule TAKE 1 CAPSULE BY MOUTH ONCE DAILY 90 capsule 1     pravastatin (PRAVACHOL) 10 MG tablet TAKE 1 TABLET BY MOUTH ONCE DAILY 90 tablet 1     procyan olig/ubi/vit A/Hb#155 (PYCNOGENOL COMPLEX ORAL) Take 100 mg by mouth daily.              tamsulosin (FLOMAX) 0.4 mg cap Take 1 capsule (0.4 mg total) by mouth daily for 14 days. 14 capsule 1     triamcinolone (KENALOG) 0.5 % ointment Apply sparingly to rash on leg twice daily until clear.  Mary Maya MD 30 g 0     clopidogrel (PLAVIX) 75 mg tablet Take 1 tablet (75 mg total) by mouth daily. 90 tablet 3     nitroglycerin (NITROSTAT) 0.4 MG SL tablet Place 0.4 mg under the tongue every 5 (five) minutes as needed for chest pain.              oxyCODONE (ROXICODONE) 5 MG immediate release tablet Take 1-2 tablets (5-10 mg total) by mouth every 4 (four) hours as needed for pain. 20 tablet 0     sulfamethoxazole-trimethoprim (SEPTRA DS) 800-160 mg per tablet Take 1 tablet by mouth 2 (two) times a day for 10 days. 14 tablet 0     No current facility-administered medications for this visit.         Allergies   Allergen Reactions     Glucosamine Shortness Of Breath and Swelling     Atorvastatin Other (See Comments)     Codeine      Latex      Added based on information entered during case entry, please review and add reactions, type, and severity as needed     Shellfish Derived      Latex Rash       Patient Active Problem List   Diagnosis     Hyperlipidemia     Acute Myocarditis     Hypertension     Abdominal Pain     Shortness Of Breath     MARY (obstructive sleep apnea)     Hematuria, microscopic     Basal cell carcinoma of neck     CAD (coronary artery disease)     History of kidney stones       Past Medical  History:   Diagnosis Date     Acute myocarditis      Basal cell carcinoma of neck      Coronary artery disease      GERD (gastroesophageal reflux disease)      H. pylori duodenitis      Hematuria      Hyperlipemia      Hypertension      Kidney stone     Left ureteral stone.  URS removal.     Myocardial infarction (H)     mid to distal inferolateral myocardial infarction with near transmural uptake of gadolinium.      Rash      Shortness of breath      Sleep apnea     No CPAP       Past Surgical History:   Procedure Laterality Date     CARDIAC CATHETERIZATION  14    arteries clean     CYSTOSCOPY W/ URETERAL STENT PLACEMENT Right 2019    Procedure: CYSTOSCOPY, WITH URETERAL STENT REPLACEMENT;  Surgeon: Esau Salinas MD;  Location: [facility];  Service: Urology     KNEE SURGERY  2015    Quad tendon repair, off work  - 2016 due to injury     MOHS SURGERY  2016    Basal cell cancer back     URETEROSCOPY Left     with ureteral stent        Social History     Socioeconomic History     Marital status: Single     Spouse name: Not on file     Number of children: Not on file     Years of education: Not on file     Highest education level: Not on file   Occupational History     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 [employer]     Employer: [employer]   Social Needs     Financial resource strain: Not on file     Food insecurity:     Worry: Not on file     Inability: Not on file     Transportation needs:     Medical: Not on file     Non-medical: Not on file   Tobacco Use     Smoking status: Former Smoker     Last attempt to quit: 3/4/2014     Years since quittin.4     Smokeless tobacco: Former User     Quit date: 3/4/2014   Substance and Sexual Activity     Alcohol use: Yes     Comment: rare     Drug use: No     Sexual activity: Not on file   Lifestyle     Physical activity:     Days per week: Not on file     Minutes per session: Not on file     Stress: Not on file  "  Relationships     Social connections:     Talks on phone: Not on file     Gets together: Not on file     Attends Protestant service: Not on file     Active member of club or organization: Not on file     Attends meetings of clubs or organizations: Not on file     Relationship status: Not on file     Intimate partner violence:     Fear of current or ex partner: Not on file     Emotionally abused: Not on file     Physically abused: Not on file     Forced sexual activity: Not on file   Other Topics Concern     Not on file   Social History Narrative    Currently lives with his sister Lizbeth and family.  Father Valeriano has dementia and now lives in a group home.         Patient Care Team:  Mary Maya MD as PCP - General (Family Medicine)  Konstantin Smart MD (Cardiology)          Objective:     Vitals:    08/23/19 1402   BP: 120/72   Pulse: 77   Temp: 98.5  F (36.9  C)   TempSrc: Oral   SpO2: 97%   Weight: (!) 247 lb (112 kg)   Height: 5' 11\" (1.803 m)         Physical Exam:   General appearance - alert, well appearing, and in no distress  Mental status - alert, oriented to person, place, and time  Eyes - pupils equal and reactive, extraocular eye movements intact  Ears - bilateral TM's and external ear canals normal  Nose - normal and patent, no erythema, discharge or polyps  Mouth - mucous membranes moist, pharynx normal without lesions  Neck - supple, no significant adenopathy, carotids upstroke normal bilaterally, no bruits  Lymphatics - no palpable lymphadenopathy, no hepatosplenomegaly  Chest - clear to auscultation, no wheezes, rales or rhonchi, symmetric air entry  Heart - normal rate, regular rhythm, normal S1, S2, no murmurs, rubs, clicks or gallops  Abdomen - soft, nontender, nondistended, no masses or organomegaly  Back exam - full range of motion, no tenderness, palpable spasm or pain on motion  Neurological - alert, oriented, normal speech, no focal findings or movement disorder noted, neck supple " without rigidity, cranial nerves II through XII intact, motor and sensory grossly normal bilaterally, normal muscle tone, no tremors, strength 5/5  Musculoskeletal - no joint tenderness, deformity or swelling  Extremities - peripheral pulses normal, no pedal edema, no clubbing or cyanosis  Skin - normal coloration and turgor, no rashes, no suspicious skin lesions noted      There are no Patient Instructions on file for this visit.    EKG:    Most Recent EKG     Units 08/23/19  1425   VENTRATE BPM 76   ATRIALRATE BPM 76   QRSDURATION ms 92   QTINTERVAL ms 376   QTCCALC ms 423   P Axis degrees 55   RAXIS degrees 47   TAXIS degrees 17   MUSEDX  Normal sinus rhythm  Normal ECG  When compared with ECG of 29-AUG-2018 10:30,  No significant change was found       Labs:  No labs were ordered during this visit    Immunization History   Administered Date(s) Administered     INFLUENZA,RECOMBINANT,INJ,PF QUADRIVALENT 18+YRS 12/03/2018     Influenza, seasonal,quad inj 36+ mos 10/18/2017     Influenza, seasonal,quad inj 6-35 mos 09/17/2014     Influenza,seasonal quad, PF, 36+MOS 12/04/2015, 01/23/2017     Tdap 08/29/2018           Electronically signed by Long Lundberg CNP 08/23/19 2:04 PM

## 2021-05-31 NOTE — TELEPHONE ENCOUNTER
Patient Returning Call  Reason for call:  Patient stated he is returning a missed call.  Information relayed to patient:  No information available to relay to the patient.  Patient has additional questions:  No  If YES, what are your questions/concerns:  N/a  Okay to leave a detailed message?: No   [phone]

## 2021-05-31 NOTE — TELEPHONE ENCOUNTER
Medication Question or Clarification  Who is calling: Patient  What medication are you calling about? (include dose and sig)   clopidogrel (PLAVIX) 75 mg tablet 90 tablet 3 8/12/2019     Sig - Route: Take 1 tablet (75 mg total) by mouth daily. - Oral        Who prescribed the medication?: Dr Maya    What is your question/concern?: Need to know if he can stop taking the Plavix for to get the kidney stone procedure as they did not do it today due to taking this med.    Please have covering provider address today.    Pharmacy: [facility]  Okay to leave a detailed message?: Yes

## 2021-05-31 NOTE — ANESTHESIA PREPROCEDURE EVALUATION
Anesthesia Evaluation      Patient summary reviewed   No history of anesthetic complications     Airway   Mallampati: II  Neck ROM: full   Pulmonary     breath sounds clear to auscultation  (+) shortness of breath, sleep apnea on no CPAP, , a smoker (former)                         Cardiovascular   Exercise tolerance: > or = 4 METS  (+) hypertension, past MI, CAD, , hypercholesterolemia,     (-) murmur  ECG reviewed (8/23/19: NSR, 76 bpm)  Rhythm: regular  Rate: normal,    no murmur   ROS comment: 11/3/2014 Echo:  CONCLUSION:    Normal LV size and systolic function. EF >/= 55%. Mid-lateral     hypokinesis.    Normal RV function.    No gross pericardial effusion.    Normal IVC.    Intact IAS (with negative bubble study and color Doppler for     shunting).      Limited study.      Left Ventricular Ejection Fraction:  60 %      Neuro/Psych - negative ROS     Endo/Other    (+) obesity (BMI 34.17),      GI/Hepatic/Renal    (+) GERD,   chronic renal disease,      Other findings: Labs 8/12/19:  Hgb 17.4, Plt 265  Na 140, K 4.1, BUN 25, Cr 1.05        Dental - normal exam   (+) caps                       Anesthesia Plan  Planned anesthetic: general endotracheal  GETA.  Decadron and zofran for PONV ppx.  Preop meds per Urology orders (PO tylenol 1000 mg, Ketorolac 15 mg, gabapentin 300 mg).    ASA 3   Induction: intravenous   Anesthetic plan and risks discussed with: patient  Anesthesia plan special considerations: antiemetics,   Post-op plan: routine recovery

## 2021-05-31 NOTE — PROGRESS NOTES
PROGRESS NOTE       SUBJECTIVE:  Osbaldo Garcia is a 54 y.o. male   Chief Complaint   Patient presents with     Hospital Visit Follow Up     twan 8/27/19 kidney stone reomoval ,      Patient is here today not really sure why he was asked to schedule to see me the day after he was discharged but he did what he was told.  He is feeling really tired from lack of sleep.  He is now off the Plavix and did not really understand why.  I explained that it can increase his risk of bleeding and they do not want him doing that after his procedure.  He is to stay off of it for a week and take aspirin 81 mg daily.  Then he will go back on it.  He has been careful to take his medications as prescribed and he brings them all in with him today.  I reviewed all of them with him.  He was pleased with his overall care.  It was much more involved than he was anticipating.  He was not excited to have the stent in but he understands the rationale.    Patient Active Problem List   Diagnosis     Hyperlipidemia     Acute Myocarditis     Hypertension     Abdominal Pain     Shortness Of Breath     MARY (obstructive sleep apnea)     Hematuria, microscopic     Basal cell carcinoma of neck     CAD (coronary artery disease)     History of kidney stones       Current Outpatient Medications   Medication Sig Dispense Refill     ascorbic acid, vitamin C, (VITAMIN C) 500 MG tablet Take 500 mg by mouth daily.       aspirin 81 MG EC tablet Take 1 tablet (81 mg total) by mouth daily.  0     cholecalciferol, vitamin D3, 1,000 unit tablet Take 5,000 Units by mouth daily.              coQ10, ubiquinol, 200 mg cap Take 200 mg by mouth daily.              garlic 500 mg cap Take 1,100 mg by mouth daily.              hydroCHLOROthiazide (HYDRODIURIL) 50 MG tablet Take 1 tablet (50 mg total) by mouth daily. 90 tablet 3     losartan (COZAAR) 100 MG tablet Take 1 tablet (100 mg total) by mouth daily. 90 tablet 2     metoprolol succinate (TOPROL-XL) 25 MG Take 1  tablet (25 mg total) by mouth daily. 90 tablet 3     MULTIVIT &MINERALS/FERROUS FUM (MULTI VITAMIN ORAL) Take 1 tablet by mouth daily.              pantoprazole (PROTONIX) 40 MG tablet Take 1 tablet (40 mg total) by mouth daily. 90 tablet 2     potassium chloride (MICRO-K) 10 mEq CR capsule TAKE 1 CAPSULE BY MOUTH ONCE DAILY 90 capsule 1     pravastatin (PRAVACHOL) 10 MG tablet TAKE 1 TABLET BY MOUTH ONCE DAILY 90 tablet 1     HYDROmorphone (DILAUDID) 2 MG tablet Take 1 tablet (2 mg total) by mouth every 6 (six) hours as needed for pain. 12 tablet 0     nitroglycerin (NITROSTAT) 0.4 MG SL tablet Place 0.4 mg under the tongue every 5 (five) minutes as needed for chest pain.              oxyCODONE (ROXICODONE) 5 MG immediate release tablet Take 5 mg by mouth every 4 (four) hours as needed for pain.       procyan olig/ubi/vit A/Hb#155 (PYCNOGENOL COMPLEX ORAL) Take 100 mg by mouth daily.              triamcinolone (KENALOG) 0.5 % ointment Apply sparingly to rash on leg twice daily until clear.  Mary Maya MD 30 g 0     No current facility-administered medications for this visit.        Social History     Tobacco Use   Smoking Status Former Smoker     Last attempt to quit: 3/4/2014     Years since quittin.4   Smokeless Tobacco Former User     Quit date: 3/4/2014       REVIEW OF SYSTEMS:  Patient denies fever, chills, dizziness, headache, visual change, ear pain, cough, chest pain, shortness of breath, abdominal pain, extremity pain or swelling, rash,  depression or anxiety.    OBJECTIVE:       Vitals:    19 1121   BP: 130/72   Pulse: 63   Temp: 98.2  F (36.8  C)   SpO2: 97%     Weight: (!) 254 lb (115.2 kg)    Wt Readings from Last 3 Encounters:   19 (!) 254 lb (115.2 kg)   19 (!) 244 lb 14.4 oz (111.1 kg)   19 (!) 247 lb (112 kg)     Body mass index is 35.43 kg/m .        Physical Exam:  GENERAL APPEARANCE: A&A, NAD, well hydrated, well nourished  SKIN:  Normal skin turgor, no  lesions/rashes   EARS: TM's normal, gray with nl light reflex  OROPHARYNX: without erythema, no post nasal drainage or thrush  NECK: Supple, without lymphadenopathy, no thyroid mass  CV: RRR, no M/G/R   LUNGS: CTAB, normal respiratory effort  ABDOMEN: S&NT, no masses, no organomegaly   EXTREMITY: Extremities normal, atraumatic, no swelling  NEURO: no gross deficits   PSYCHIATRIC:  Mood appropriate, memory intact        ASSESSMENT/PLAN:     1. Kidney stone  Patient actually shows me the stone which was quite large.  He will continue drinking his fluids evenly throughout the day and eat smaller more frequent meals in order to get his appetite back.  He should walk a little bit more each day.  He will follow-up with urology to have his stent removed.  He will follow-up with me on a as needed basis.        I spent a total of 28 minutes face to face with the patient.  Over 50% of the time spent counseling and educating the patient about all of the above.      Mary Maya MD

## 2021-05-31 NOTE — ANESTHESIA POSTPROCEDURE EVALUATION
Patient: Osbaldo Garcia  CYSTOSCOPY, WITH URETERAL STENT REPLACEMENT  Anesthesia type: general    Patient location: PACU  Last vitals:   Vitals Value Taken Time   /73 8/16/2019 10:30 AM   Temp 36.6  C (97.9  F) 8/16/2019 10:17 AM   Pulse 73 8/16/2019 10:37 AM   Resp 17 8/16/2019 10:37 AM   SpO2 95 % 8/16/2019 10:37 AM   Vitals shown include unvalidated device data.  Post vital signs: stable  Level of consciousness: awake and responds to simple questions  Post-anesthesia pain: pain controlled  Post-anesthesia nausea and vomiting: no  Pulmonary: unassisted, return to baseline  Cardiovascular: stable and blood pressure at baseline  Hydration: adequate  Anesthetic events: no    QCDR Measures:  ASA# 11 - Jaymie-op Cardiac Arrest: ASA11B - Patient did NOT experience unanticipated cardiac arrest  ASA# 12 - Jaymie-op Mortality Rate: ASA12B - Patient did NOT die  ASA# 13 - PACU Re-Intubation Rate: ASA13B - Patient did NOT require a new airway mgmt  ASA# 10 - Composite Anes Safety: ASA10A - No serious adverse event    Additional Notes:

## 2021-05-31 NOTE — PROGRESS NOTES
Assessment/Plan:        Diagnoses and all orders for this visit:    Calculus of kidney  -     Patient Stated Goal: Know what to expect after surgery  -     oxyCODONE (ROXICODONE) 5 MG immediate release tablet; Take 1-2 tablets (5-10 mg total) by mouth every 4 (four) hours as needed for pain.  Dispense: 20 tablet; Refill: 0  -     tamsulosin (FLOMAX) 0.4 mg cap; Take 1 capsule (0.4 mg total) by mouth daily for 14 days.  Dispense: 14 capsule; Refill: 1  -     Ureteroscopy Education    Back pain  -     Urinalysis Macroscopic    Other orders  -     Verify informed consent; Standing  -     Diet NPO; Standing  -     Place sequential compression device; Standing  -     XR Retrograde Pyelogram W or WO KUB Intraoperative; Standing  -     levoFLOXacin 500 mg/100 mL IVPB 500 mg (LEVAQUIN)  -     ketorolac injection 15 mg (TORADOL)  -     acetaminophen tablet 1,000 mg (TYLENOL)  -     sterile water IR irrigation solution 3,000 mL  -     gabapentin capsule 300 mg (NEURONTIN)      Stone Management Plan  [facility] Stone Management 8/13/2019   Urinary Tract Infection No suspicion of infection   Renal Colic Well controlled symptoms   Renal Failure No suspicion of renal failure   Current CT date 8/8/2019   Right sided stones? Yes   R Number of ureteral stones No ureteral stones   R Number of kidney stones  1   R GSD of kidney stones 10 - 15   R Hydronephrosis None   R Stone Event New event   Diagnosis date 8/8/2019   Initial location of primary symptomatic stone Renal   Initial GSD of primary symptomatic stone 11   R Current Plan Clear   Clear rationale Symptomatic   Left sided stones? No   L Stone Event No current event             Subjective:      HPI  Mr. Osbaldo Garcia is a 54 y.o.  male presenting to the [facility] [place] for a new problem.    He is a remotely recurrent unidentified composition stone former who has required stone clearance procedures. He has not previously participated in stone risk  evaluation. He has no identified modifiable stone risk factors. He has no identified non-modifiable stone risk factors.    He has been having right sided flank pain for about 10 days. His recent vacation was consumed by lying in bed in pain. Pain has settled somewhat but he is currently off work as a  for [employer]. No fever or chills. No nausea or vomiting. Pain is not as severe as last ureteral stone treated by ureteroscopy in [place] in 2007.    CT scan is personally reviewed and demonstrates an 11 mm (1254 HU) right lower pole stone which has appeared to migrate centrally and may be ball valving at UPJ.    Significant labs from presentation include are summarized below.    PLAN  Will proceed with ureterscopic stone clearance this week. Risks and benefits were detailed of ureteroscopic stone clearance including potential issues of urinary or systemic infection, ureteral injury, inaccessible stone, incomplete stone clearance, multiple surgeries, and stent related symptoms of urgency, frequency and hematuria Patient verbalized understanding. Patient agrees with plan as discussed. Preoperative evaluation with primary care has not been requested because of urgency of situation.    He is aware that surgery will be scheduled with Dr. Salinas.    For symptom control, he was prescribed oxycodone and Flomax. Over the counter symptom control medications of ibuprofen, Dramamine and Tylenol were recommended.     ROS   Review of Systems  A 12 point comprehensive review of systems is negative except for HPI    Past Medical History:   Diagnosis Date     H. pylori duodenitis      Hyperlipemia      Kidney stone 2007    Left ureteral stone.  URS removal.     Myocardial infarction (H) 2015    mid to distal inferolateral myocardial infarction with near transmural uptake of gadolinium.        Past Surgical History:   Procedure Laterality Date     CARDIAC CATHETERIZATION  6/29/14    arteries clean     KNEE SURGERY   09/19/2015    Quad tendon repair, off work August 7 - August 29 2016 due to injury     MOHS SURGERY  04/26/2016    Basal cell cancer back     URETEROSCOPY Left     with ureteral stent        Current Outpatient Medications   Medication Sig Dispense Refill     ascorbic acid (VITAMIN C) 1000 MG tablet Take 1,000 mg by mouth daily.       cholecalciferol, vitamin D3, 1,000 unit tablet Take 1,000 Units by mouth 2000 IU.             clopidogrel (PLAVIX) 75 mg tablet Take 1 tablet (75 mg total) by mouth daily. 90 tablet 3     coQ10, ubiquinol, 200 mg cap Take by mouth.       garlic 500 mg cap        hydroCHLOROthiazide (HYDRODIURIL) 50 MG tablet Take 1 tablet (50 mg total) by mouth daily. 90 tablet 3     Lactobacillus acidophilus (BACID) 10 billion cell capsule Take by mouth.       losartan (COZAAR) 100 MG tablet Take 1 tablet (100 mg total) by mouth daily. 90 tablet 2     metoprolol succinate (TOPROL-XL) 25 MG Take 1 tablet (25 mg total) by mouth daily. 90 tablet 3     MULTIVIT &MINERALS/FERROUS FUM (MULTI VITAMIN ORAL) Take by mouth.       nitroglycerin (NITROSTAT) 0.4 MG SL tablet Place 0.4 mg under the tongue.       pantoprazole (PROTONIX) 40 MG tablet Take 1 tablet (40 mg total) by mouth daily. 90 tablet 2     potassium chloride (MICRO-K) 10 mEq CR capsule TAKE 1 CAPSULE BY MOUTH ONCE DAILY 90 capsule 1     pravastatin (PRAVACHOL) 10 MG tablet TAKE 1 TABLET BY MOUTH ONCE DAILY 90 tablet 1     procyan olig/ubi/vit A/Hb#155 (PYCNOGENOL COMPLEX ORAL) Take by mouth.       triamcinolone (KENALOG) 0.5 % ointment Apply sparingly to rash on leg twice daily until clear.  Mary Maya MD 30 g 0     oxyCODONE (ROXICODONE) 5 MG immediate release tablet Take 1-2 tablets (5-10 mg total) by mouth every 4 (four) hours as needed for pain. 20 tablet 0     tamsulosin (FLOMAX) 0.4 mg cap Take 1 capsule (0.4 mg total) by mouth daily for 14 days. 14 capsule 1     No current facility-administered medications for this visit.         Allergies   Allergen Reactions     Glucosamine Shortness Of Breath and Swelling     Atorvastatin Other (See Comments)     Codeine      Shellfish Derived      Latex Rash       Social History     Socioeconomic History     Marital status: Single     Spouse name: Not on file     Number of children: Not on file     Years of education: Not on file     Highest education level: Not on file   Occupational History     Occupation: loads and unloads cargo     Employer: [employer]   Social Needs     Financial resource strain: Not on file     Food insecurity:     Worry: Not on file     Inability: Not on file     Transportation needs:     Medical: Not on file     Non-medical: Not on file   Tobacco Use     Smoking status: Former Smoker     Smokeless tobacco: Former User     Quit date: 3/4/2014   Substance and Sexual Activity     Alcohol use: Yes     Drug use: No     Sexual activity: Not on file   Lifestyle     Physical activity:     Days per week: Not on file     Minutes per session: Not on file     Stress: Not on file   Relationships     Social connections:     Talks on phone: Not on file     Gets together: Not on file     Attends Congregational service: Not on file     Active member of club or organization: Not on file     Attends meetings of clubs or organizations: Not on file     Relationship status: Not on file     Intimate partner violence:     Fear of current or ex partner: Not on file     Emotionally abused: Not on file     Physically abused: Not on file     Forced sexual activity: Not on file   Other Topics Concern     Not on file   Social History Narrative    Currently lives with his sister Lizbeth and family.  Father Valeriano has dementia and now lives in a group home.         Family History   Problem Relation Age of Onset     Coronary artery disease Father         cardiac arrest age 50, survivied, d. summer 2017     Hypertension Sister        Objective:      Physical Exam  Vitals:    08/13/19 1333   BP: 123/76    Pulse: 68   Temp: 97.9  F (36.6  C)     General - well developed, well nourished, appropriate for age. Appears no distress at this time   Heart - regular rate and rhythm, no murmur  Respiratory - normal effort, clear to auscultation, good air entry without adventitious noises  Abdomen - moderately obese soft, non-tender, no hepatosplenomegaly, no masses.   - no flank tenderness, no suprapubic tenderness, kidney and bladder non-palpable  MSK - normal spinal curvature. no spinal tenderness. normal gait. muscular strength intact.  Neurology - cranial nerves II-XII grossly intact, normal sensation, no unsteadiness  Skin - intact, no bruising, no gouty tophi  Psych - oriented to time, place, and person, normal mood and affect.      Labs  Urinalysis POC (Office):  Nitrite, UA   Date Value Ref Range Status   08/13/2019 Negative Negative Final   08/08/2019 Negative Negative Final   07/13/2017 Negative Negative Final       Lab Urinalysis:  Blood, UA   Date Value Ref Range Status   08/13/2019 Negative Negative Final   08/08/2019 Large (!) Negative Final   07/13/2017 Small (!) Negative Final     Nitrite, UA   Date Value Ref Range Status   08/13/2019 Negative Negative Final   08/08/2019 Negative Negative Final   07/13/2017 Negative Negative Final     Leukocytes, UA   Date Value Ref Range Status   08/13/2019 Negative Negative Final   08/08/2019 Negative Negative Final   07/13/2017 Negative Negative Final     pH, UA   Date Value Ref Range Status   08/13/2019 5.5 5.0 - 8.0 Final   08/08/2019 6.0 5.0 - 8.0 Final   07/13/2017 7.0 5.0 - 8.0 Final    and Acute Labs   CBC   WBC   Date Value Ref Range Status   08/12/2019 7.4 4.0 - 11.0 thou/uL Final   08/29/2018 6.5 4.0 - 11.0 thou/uL Final   02/26/2018 6.9 4.0 - 11.0 thou/uL Final     Hemoglobin   Date Value Ref Range Status   08/12/2019 17.4 14.0 - 18.0 g/dL Final   08/29/2018 15.6 14.0 - 18.0 g/dL Final   02/26/2018 17.2 14.0 - 18.0 g/dL Final     Platelets   Date Value Ref Range  Status   08/12/2019 265 140 - 440 thou/uL Final   08/29/2018 264 140 - 440 thou/uL Final   02/26/2018 250 140 - 440 thou/uL Final   , C Reactive Protein  No results found for: CRP, Renal Panel  [facility]  Creatinine   Date Value Ref Range Status   08/12/2019 1.05 0.70 - 1.30 mg/dL Final   12/03/2018 0.80 0.70 - 1.30 mg/dL Final   08/29/2018 0.94 0.70 - 1.30 mg/dL Final     Potassium   Date Value Ref Range Status   08/12/2019 4.1 3.5 - 5.0 mmol/L Final   12/03/2018 4.3 3.5 - 5.0 mmol/L Final   08/29/2018 4.6 3.5 - 5.0 mmol/L Final     Calcium   Date Value Ref Range Status   08/12/2019 10.2 8.5 - 10.5 mg/dL Final   12/03/2018 9.7 8.5 - 10.5 mg/dL Final   08/29/2018 9.3 8.5 - 10.5 mg/dL Final    and Urine Culture    Culture   Date Value Ref Range Status   07/13/2017 No Growth  Final

## 2021-05-31 NOTE — ANESTHESIA CARE TRANSFER NOTE
Last vitals:   Vitals:    08/16/19 1017   BP: 129/67   Pulse:    Resp: 16   Temp: 36.6  C (97.9  F)   SpO2: 99%     Patient's level of consciousness is drowsy  Spontaneous respirations: yes  Maintains airway independently: yes  Dentition unchanged: yes  Oropharynx: oropharynx clear of all foreign objects    QCDR Measures:  ASA# 20 - Surgical Safety Checklist: WHO surgical safety checklist completed prior to induction    PQRS# 430 - Adult PONV Prevention: 4558F - Pt received => 2 anti-emetic agents (different classes) preop & intraop  ASA# 8 - Peds PONV Prevention: NA - Not pediatric patient, not GA or 2 or more risk factors NOT present  PQRS# 424 - Jaymie-op Temp Management: 4559F - At least one body temp DOCUMENTED => 35.5C or 95.9F within required timeframe  PQRS# 426 - PACU Transfer Protocol: - Transfer of care checklist used  ASA# 14 - Acute Post-op Pain: ASA14B - Patient did NOT experience pain >= 7 out of 10

## 2021-06-01 VITALS — BODY MASS INDEX: 35 KG/M2 | HEIGHT: 71 IN | WEIGHT: 250 LBS

## 2021-06-01 NOTE — PROGRESS NOTES
Assessment/Plan:        Diagnoses and all orders for this visit:    Calculus of kidney  -     Urinalysis Macroscopic  -     Culture, Urine- Future; Future; Expected date: 10/04/2019  -     Culture, Urine- Future  -     ciprofloxacin HCl tablet 250 mg (CIPRO)  -     lidocaine HCl 2 % topical jelly 10 mL (UROJET)  -     Cystoscopy with Stent Removal Education  -     CT Abdomen Pelvis Without Oral Without IV Contrast; Future; Expected date: 10/04/2019  -     Patient Stated Goal: Prevent further stones    Other orders  -     ciprofloxacin HCl (CIPRO) 250 MG tablet  -     lidocaine HCl (UROJET) 2 % topical jelly      Stone Management Plan  [facility] Stone Management 8/13/2019 9/4/2019   Urinary Tract Infection No suspicion of infection No suspicion of infection   Renal Colic Well controlled symptoms Asymptomatic at this time   Renal Failure No suspicion of renal failure No suspicion of renal failure   Current CT date 8/8/2019 -   Right sided stones? Yes -   R Number of ureteral stones No ureteral stones -   R Number of kidney stones  1 -   R GSD of kidney stones 10 - 15 -   R Hydronephrosis None -   R Stone Event New event Established event   Diagnosis date 8/8/2019 -   Initial location of primary symptomatic stone Renal -   Initial GSD of primary symptomatic stone 11 -   R Post-op status - Stent Removal   R Current Plan Clear -   Clear rationale Symptomatic -   Left sided stones? No -   L Stone Event No current event -             Subjective:      HPI  Mr. Osbaldo Garcia is a 54 y.o.  male returning to the [facility] [place] for early postoperative follow up for anticipated stent removal.     He returns status post right PCNL for renal stone. He has had no unanticipated events.    He was noted to have a very narrow ureter proximally at the UPJ during surgery.    He has had no symptoms suspicious for infection and stent was very well tolerated.     Flexible cystoscopy is performed and indwelling stent  is removed without incident.    He will follow up in the office in one month with imaging.       ROS   A 12 point comprehensive review of systems is negative except for HPI.    Past Medical History:   Diagnosis Date     Acute myocarditis      Basal cell carcinoma of neck      Coronary artery disease      GERD (gastroesophageal reflux disease)      H. pylori duodenitis      Hematuria      Hyperlipemia      Hypertension      Kidney stone 2007    Left ureteral stone.  URS removal.     Myocardial infarction (H) 2015    mid to distal inferolateral myocardial infarction with near transmural uptake of gadolinium.      Rash      Shortness of breath      Sleep apnea     No CPAP       Past Surgical History:   Procedure Laterality Date     CARDIAC CATHETERIZATION  6/29/14    arteries clean     CYSTOSCOPY W/ URETERAL STENT PLACEMENT Right 8/16/2019    Procedure: CYSTOSCOPY, WITH URETERAL STENT REPLACEMENT;  Surgeon: Esau Salinas MD;  Location: [facility];  Service: Urology     IR NEPHROLITHOTOMY  8/26/2019     KNEE SURGERY  09/19/2015    Quad tendon repair, off work August 7 - August 29 2016 due to injury     MOHS SURGERY  04/26/2016    Basal cell cancer back     URETEROSCOPY Left     with ureteral stent        Current Outpatient Medications   Medication Sig Dispense Refill     ascorbic acid, vitamin C, (VITAMIN C) 500 MG tablet Take 500 mg by mouth daily.       aspirin 81 MG EC tablet Take 1 tablet (81 mg total) by mouth daily.  0     cholecalciferol, vitamin D3, 1,000 unit tablet Take 5,000 Units by mouth daily.              coQ10, ubiquinol, 200 mg cap Take 200 mg by mouth daily.              garlic 500 mg cap Take 1,100 mg by mouth daily.              hydroCHLOROthiazide (HYDRODIURIL) 50 MG tablet Take 1 tablet (50 mg total) by mouth daily. 90 tablet 3     HYDROmorphone (DILAUDID) 2 MG tablet Take 1 tablet (2 mg total) by mouth every 6 (six) hours as needed for pain. 12 tablet 0     losartan (COZAAR) 100  MG tablet Take 1 tablet (100 mg total) by mouth daily. 90 tablet 2     metoprolol succinate (TOPROL-XL) 25 MG Take 1 tablet (25 mg total) by mouth daily. 90 tablet 3     MULTIVIT &MINERALS/FERROUS FUM (MULTI VITAMIN ORAL) Take 1 tablet by mouth daily.              nitroglycerin (NITROSTAT) 0.4 MG SL tablet Place 0.4 mg under the tongue every 5 (five) minutes as needed for chest pain.              oxyCODONE (ROXICODONE) 5 MG immediate release tablet Take 5 mg by mouth every 4 (four) hours as needed for pain.       pantoprazole (PROTONIX) 40 MG tablet Take 1 tablet (40 mg total) by mouth daily. 90 tablet 2     potassium chloride (MICRO-K) 10 mEq CR capsule TAKE 1 CAPSULE BY MOUTH ONCE DAILY 90 capsule 1     pravastatin (PRAVACHOL) 10 MG tablet TAKE 1 TABLET BY MOUTH ONCE DAILY 90 tablet 1     procyan olig/ubi/vit A/Hb#155 (PYCNOGENOL COMPLEX ORAL) Take 100 mg by mouth daily.              triamcinolone (KENALOG) 0.5 % ointment Apply sparingly to rash on leg twice daily until clear.  Mary Maya MD 30 g 0     No current facility-administered medications for this visit.        Allergies   Allergen Reactions     Glucosamine Shortness Of Breath and Swelling     Shellfish Derived Shortness Of Breath and Swelling     Atorvastatin Myalgia     Codeine Itching     Hydrocodone Itching     Latex      Added based on information entered during case entry, please review and add reactions, type, and severity as needed     Latex Rash       Social History     Socioeconomic History     Marital status: Single     Spouse name: Not on file     Number of children: Not on file     Years of education: Not on file     Highest education level: Not on file   Occupational History     Occupation: loads and unloads cargo     Employer: [employer]   Social Needs     Financial resource strain: Not on file     Food insecurity:     Worry: Not on file     Inability: Not on file     Transportation needs:     Medical: Not on file     Non-medical:  Not on file   Tobacco Use     Smoking status: Former Smoker     Last attempt to quit: 3/4/2014     Years since quittin.5     Smokeless tobacco: Former User     Quit date: 3/4/2014   Substance and Sexual Activity     Alcohol use: Yes     Comment: rare     Drug use: No     Sexual activity: Not on file   Lifestyle     Physical activity:     Days per week: Not on file     Minutes per session: Not on file     Stress: Not on file   Relationships     Social connections:     Talks on phone: Not on file     Gets together: Not on file     Attends Jainism service: Not on file     Active member of club or organization: Not on file     Attends meetings of clubs or organizations: Not on file     Relationship status: Not on file     Intimate partner violence:     Fear of current or ex partner: Not on file     Emotionally abused: Not on file     Physically abused: Not on file     Forced sexual activity: Not on file   Other Topics Concern     Not on file   Social History Narrative    Currently lives with his sister Lizbeth and family.  Father Valeriano has dementia and now lives in a group home.         Family History   Problem Relation Age of Onset     Coronary artery disease Father         cardiac arrest age 50, shade [place] summer 2017     Hypertension Sister      Objective:      Physical Exam  Vitals:    19 1030   BP: 138/79   Pulse: 66   Temp: 97.6  F (36.4  C)     General - well developed, well nourished, appropriate for age. Appears no distress at this time   Heart - regular rate and rhythm, no murmur  Respiratory - normal effort, clear to auscultation, good air entry without adventitious noises  Abdomen - mildly obese soft, non-tender, no hepatosplenomegaly, no masses.   - no flank tenderness, no suprapubic tenderness, kidney and bladder non-palpable  MSK - normal spinal curvature. no spinal tenderness. normal gait. muscular strength intact.  Neurology - cranial nerves II-XII grossly intact, normal sensation, no  unsteadiness  Skin - intact, no bruising, no gouty tophi  Psych - oriented to time, place, and person, normal mood and affect.      Labs   Urinalysis POC (Office):  Nitrite, UA   Date Value Ref Range Status   09/04/2019 Negative Negative Final   08/13/2019 Negative Negative Final   08/08/2019 Negative Negative Final       Lab Urinalysis:  Blood, UA   Date Value Ref Range Status   09/04/2019 Large (!) Negative Final   08/13/2019 Negative Negative Final   08/08/2019 Large (!) Negative Final     Nitrite, UA   Date Value Ref Range Status   09/04/2019 Negative Negative Final   08/13/2019 Negative Negative Final   08/08/2019 Negative Negative Final     Leukocytes, UA   Date Value Ref Range Status   09/04/2019 Small (!) Negative Final   08/13/2019 Negative Negative Final   08/08/2019 Negative Negative Final     pH, UA   Date Value Ref Range Status   09/04/2019 6.0 5.0 - 8.0 Final   08/13/2019 5.5 5.0 - 8.0 Final   08/08/2019 6.0 5.0 - 8.0 Final

## 2021-06-01 NOTE — PATIENT INSTRUCTIONS - HE
Patient Stated Goal: Prevent further stones  Steps for collecting a 24 hour urine specimen    Please follow the directions carefully. All urine voided for a 24-hour period needs to be collected into the jug.  DO NOT change any of your  normal  daily habits when doing this test. Continue to follow your regular diet, intake of fluids, and usual activity level. Pick the most convenient day with your schedule, perhaps on a weekend or a day off.    Start your Diet Log the day before collection and continue on the day of urine collection.  You MUST bring Diet Log with you on follow up visit to discuss results.    One 24hr Urine Collection     Two 24hr Urine Collections  (do not collect on consecutive days)    PLEASE COMPLETE THE 2nd JUG WITHIN 1-2 WEEKS FROM THE 1st JUG    STEP 1  Empty your bladder completely into the toilet. This will be your start time. Write your full legal name, start date and time on the jug label.  Collection start and stop times need to match exactly!  For example:  6 am to 6 am.    STEP 2  The next time you urinate, empty your bladder directly into the jug or collection hat and pour urine into the jug.  Screw the lid back onto the jug.  Do not spill!    STEP 3  Place the jug in the refrigerator or a cooler with ice during the collection period.  Failure to keep it cool could cause inaccurate test results. DO NOT Freeze.    STEP 4  Continue collecting all urine into the jug for the rest of the day, for the full 24 hours.  DO NOT stop early or go over 24 hours!    STEP 5  Exactly 24 hours from start of collection, write your full legal name, stop date and time on the jug label.   Collection start and stop times need to match exactly!  For example:  6 am to 6 am.  Failure to label correctly will result in recollection of urine specimen.    STEP 6  Return each jug within 24 hours after final urination.     STEP 7  Drop off jug locations:   [facility]: Mon-Fri 7am-7pm - Closed on  weekends  [facility]: Mon-Fri 7am-5pm - Closed on Sunday  [facility]: Mon-Fri 7am-6:30pm - Closed on weekends    STEP 8  Please call [facility] after return of your final jug to schedule your follow-up visit. [phone]

## 2021-06-01 NOTE — PROGRESS NOTES
Patient educated regarding stent removal procedure and possible symptoms after removal.  Patient voiced understanding of information.  Handout given to patient.  Consent form signed.    [facility] Timeout    Correct patient?: Yes  Correct site?:  Yes  Correct procedure?:  Yes  Correct laterality?:  Right  Consents verified?:  Yes  Relevant lab results available?:  Yes

## 2021-06-01 NOTE — PATIENT INSTRUCTIONS - HE
Patient Stated Goal: Prevent further stones  Cystoscopy with Stent Removal    Cystoscopy is used to help diagnose urinary problems, or to remove a ureteral stent.    During a cystoscopy, your doctor examines the inside of your bladder with an instrument called a cystoscope. A cystoscope is a long, thin flexible tube with a camera at the end.    Your doctor will insert the scope into your urethra allowing him to visualize and evaluate the inside of the bladder for possible abnormalities. The urethra is the tube that carries urine to the outside of your body.    How is the stent removed?    Your stent will be removed in the [facility] with a small telescope and a grasping tool.  It usually takes less than 1 minute to remove the stent.    What should I expect after the stent is removed?     You should feel normal by the next day.    Some patients find:      An increase in back pain about an hour after the stent is removed as the kidney fills up with urine before it starts to empty.  It can be as uncomfortable as your initial stone episode.  Taking pain medications before stent removal may be helpful, but you would need someone else to drive you to and from your appointment.    Bladder symptoms usually disappear by the next morning.    Small amounts of blood in the urine may be seen occasionally for up to a week.    At Home:      It is important to drink plenty of fluids after your procedure    You may continue to use your pain medications as prescribed    What symptoms should I watch for?    Fever     Chills    Increasing back pain that is not relieved with pain medications    Large amounts of blood in the urine or large clots    Leakage of urine (incontinence)     Are not able to urinate for 8 hours    These symptoms may mean you have a blockage or infection. Call the [facility] 24 hours a day at [phone] immediately.

## 2021-06-01 NOTE — PROGRESS NOTES
Assessment/Plan:        Diagnoses and all orders for this visit:    Calculus of kidney  -     Urinalysis Macroscopic  -     NM Renogram W Lasix; Future; Expected date: 10/02/2019  -     Magnesium, 24 Hour Urine; Future  -     Stone Formation, 24 Hour Urine (does not include Magnesium); Standing  -     Patient Stated Goal: Prevent further stones  -     24 Hour Urine Collection Steps Education      Stone Management Plan  [facility] Stone Management 8/13/2019 9/4/2019 10/2/2019   Urinary Tract Infection No suspicion of infection No suspicion of infection No suspicion of infection   Renal Colic Well controlled symptoms Asymptomatic at this time Asymptomatic at this time   Renal Failure No suspicion of renal failure No suspicion of renal failure No suspicion of renal failure   Current CT date 8/8/2019 - 10/2/2019   Right sided stones? Yes - No   R Number of ureteral stones No ureteral stones - -   R Number of kidney stones  1 - -   R GSD of kidney stones 10 - 15 - -   R Hydronephrosis None - -   R Stone Event New event Established event Resolved event   Diagnosis date 8/8/2019 - -   Initial location of primary symptomatic stone Renal - -   Initial GSD of primary symptomatic stone 11 - -   Resolved date - - 10/2/2019   R Post-op status - Stent Removal No residual stone   R Current Plan Clear - -   Clear rationale Symptomatic - -   Left sided stones? No - No   L Stone Event No current event - No current event         Subjective:      HPI  Mr. Osbaldo Garcia is a 54 y.o.  male returning to the [facility] [place] for late postoperative follow-up.     He returns status post Right PCNL for renal stone. He has had no unanticipated events.     He admits to intermittent right flank discomfort, provoked after consuming large amount of beverages or EtOH. He is asymptomatic at present. He denies symptoms of fever, chills, flank pain, nausea, vomiting, urinary frequency and dysuria.    New CT scan was personally  reviewed and demonstrates complete clearance of targeted stone  with resolution of previous hydronephrosis.     Stone composition was primarily calcium oxalate.     PLAN    55 yo M with hx of remotely recurrent stone disease s/p aborted URS and right PCNL for clearance of renal stone with narrow UPJ. No residual stone or hydronephrosis.    He is at risk for ongoing active stone disease and will initiate stone risk evaluation. Two 24 hour urine collections and dietary journal will be obtained at earliest covenience. He will return with lasix renogram to rule out occult obstruction.    Patient also seen and examined by ROSA Valentin   Review of systems is negative except for HPI.    Past Medical History:   Diagnosis Date     Acute myocarditis      Basal cell carcinoma of neck      Coronary artery disease      GERD (gastroesophageal reflux disease)      H. pylori duodenitis      Hematuria      Hyperlipemia      Hypertension      Kidney stone 2007    Left ureteral stone.  URS removal.     Myocardial infarction (H) 2015    mid to distal inferolateral myocardial infarction with near transmural uptake of gadolinium.      Rash      Shortness of breath      Sleep apnea     No CPAP       Past Surgical History:   Procedure Laterality Date     CARDIAC CATHETERIZATION  6/29/14    arteries clean     CYSTOSCOPY W/ URETERAL STENT PLACEMENT Right 8/16/2019    Procedure: CYSTOSCOPY, WITH URETERAL STENT REPLACEMENT;  Surgeon: Esau Salinas MD;  Location: [facility];  Service: Urology     IR NEPHROLITHOTOMY  8/26/2019     KNEE SURGERY  09/19/2015    Quad tendon repair, off work August 7 - August 29 2016 due to injury     MOHS SURGERY  04/26/2016    Basal cell cancer back     URETEROSCOPY Left     with ureteral stent        Current Outpatient Medications   Medication Sig Dispense Refill     ascorbic acid, vitamin C, (VITAMIN C) 500 MG tablet Take 500 mg by mouth daily.       aspirin 81 MG EC tablet Take 1  tablet (81 mg total) by mouth daily.  0     cholecalciferol, vitamin D3, 1,000 unit tablet Take 5,000 Units by mouth daily.              coQ10, ubiquinol, 200 mg cap Take 200 mg by mouth daily.              garlic 500 mg cap Take 1,100 mg by mouth daily.              hydroCHLOROthiazide (HYDRODIURIL) 50 MG tablet Take 1 tablet (50 mg total) by mouth daily. 90 tablet 3     losartan (COZAAR) 100 MG tablet Take 1 tablet (100 mg total) by mouth daily. 90 tablet 2     metoprolol succinate (TOPROL-XL) 25 MG Take 1 tablet (25 mg total) by mouth daily. 90 tablet 3     MULTIVIT &MINERALS/FERROUS FUM (MULTI VITAMIN ORAL) Take 1 tablet by mouth daily.              nitroglycerin (NITROSTAT) 0.4 MG SL tablet Place 0.4 mg under the tongue every 5 (five) minutes as needed for chest pain.              pantoprazole (PROTONIX) 40 MG tablet Take 1 tablet (40 mg total) by mouth daily. 90 tablet 2     potassium chloride (MICRO-K) 10 mEq CR capsule TAKE 1 CAPSULE BY MOUTH ONCE DAILY 90 capsule 1     pravastatin (PRAVACHOL) 10 MG tablet TAKE 1 TABLET BY MOUTH ONCE DAILY 90 tablet 1     procyan olig/ubi/vit A/Hb#155 (PYCNOGENOL COMPLEX ORAL) Take 100 mg by mouth daily.              triamcinolone (KENALOG) 0.5 % ointment Apply sparingly to rash on leg twice daily until clear.  Mary Maya MD 30 g 0     No current facility-administered medications for this visit.        Allergies   Allergen Reactions     Glucosamine Shortness Of Breath and Swelling     Shellfish Derived Shortness Of Breath and Swelling     Atorvastatin Myalgia     Codeine Itching     Hydrocodone Itching     Latex      Added based on information entered during case entry, please review and add reactions, type, and severity as needed     Latex Rash       Social History     Socioeconomic History     Marital status: Single     Spouse name: Not on file     Number of children: Not on file     Years of education: Not on file     Highest education level: Not on file    Occupational History     Occupation: loads and unloads cargo     Employer: [employer]   Social Needs     Financial resource strain: Not on file     Food insecurity:     Worry: Not on file     Inability: Not on file     Transportation needs:     Medical: Not on file     Non-medical: Not on file   Tobacco Use     Smoking status: Former Smoker     Last attempt to quit: 3/4/2014     Years since quittin.5     Smokeless tobacco: Former User     Quit date: 3/4/2014   Substance and Sexual Activity     Alcohol use: Yes     Comment: rare     Drug use: No     Sexual activity: Not on file   Lifestyle     Physical activity:     Days per week: Not on file     Minutes per session: Not on file     Stress: Not on file   Relationships     Social connections:     Talks on phone: Not on file     Gets together: Not on file     Attends Confucianist service: Not on file     Active member of club or organization: Not on file     Attends meetings of clubs or organizations: Not on file     Relationship status: Not on file     Intimate partner violence:     Fear of current or ex partner: Not on file     Emotionally abused: Not on file     Physically abused: Not on file     Forced sexual activity: Not on file   Other Topics Concern     Not on file   Social History Narrative    Currently lives with his sister Lizbeth and family.  Father Valeriano has dementia and now lives in a group home.         Family History   Problem Relation Age of Onset     Coronary artery disease Father         cardiac arrest age 50, survivied, d. summer 2017     Hypertension Sister      Objective:      Physical Exam  Vitals:    10/02/19 1023   BP: 141/77   Pulse: (!) 58   Temp: 97.8  F (36.6  C)     General - well developed, well nourished, appropriate for age. Appears no distress at this time  Abdomen - mildly obese soft, non-tender, no hepatosplenomegaly, no masses.   - no flank tenderness, no suprapubic tenderness, kidney and bladder non-palpable  MSK -  normal spinal curvature. no spinal tenderness. normal gait. muscular strength intact.  Psych - oriented to time, place, and person, normal mood and affect.      Labs   Urinalysis POC (Office):  Nitrite, UA   Date Value Ref Range Status   10/02/2019 Negative Negative Final   09/04/2019 Negative Negative Final   08/13/2019 Negative Negative Final       Lab Urinalysis:  Blood, UA   Date Value Ref Range Status   10/02/2019 Negative Negative Final   09/04/2019 Large (!) Negative Final   08/13/2019 Negative Negative Final     Nitrite, UA   Date Value Ref Range Status   10/02/2019 Negative Negative Final   09/04/2019 Negative Negative Final   08/13/2019 Negative Negative Final     Leukocytes, UA   Date Value Ref Range Status   10/02/2019 Negative Negative Final   09/04/2019 Small (!) Negative Final   08/13/2019 Negative Negative Final     pH, UA   Date Value Ref Range Status   10/02/2019 6.0 5.0 - 8.0 Final   09/04/2019 6.0 5.0 - 8.0 Final   08/13/2019 5.5 5.0 - 8.0 Final     Esau JONAS, personally saw and examined the patient, reviewed most recent labs and imaging and agree with the above assessment

## 2021-06-01 NOTE — PROGRESS NOTES
Patient presents to the office today for a one month post stone and stent removal follow with a CT scan.

## 2021-06-02 VITALS — WEIGHT: 251 LBS | HEIGHT: 71 IN | BODY MASS INDEX: 35.14 KG/M2

## 2021-06-02 VITALS — WEIGHT: 255 LBS | BODY MASS INDEX: 35.7 KG/M2 | HEIGHT: 71 IN

## 2021-06-03 VITALS — BODY MASS INDEX: 34.49 KG/M2 | WEIGHT: 247.31 LBS

## 2021-06-03 VITALS — HEIGHT: 71 IN | BODY MASS INDEX: 34.86 KG/M2 | WEIGHT: 249 LBS

## 2021-06-03 VITALS — BODY MASS INDEX: 35.56 KG/M2 | HEIGHT: 71 IN | WEIGHT: 254 LBS

## 2021-06-03 VITALS — HEIGHT: 71 IN | WEIGHT: 244 LBS | BODY MASS INDEX: 34.16 KG/M2

## 2021-06-03 VITALS — BODY MASS INDEX: 34.28 KG/M2 | HEIGHT: 71 IN | WEIGHT: 244.9 LBS

## 2021-06-03 VITALS — BODY MASS INDEX: 34.58 KG/M2 | WEIGHT: 247 LBS | HEIGHT: 71 IN

## 2021-06-04 NOTE — TELEPHONE ENCOUNTER
Refill Approved    Rx renewed per Medication Renewal Policy. Medication was last renewed on 4/30/19    Avis Lindquist, [facility] Triage/Med Refill 12/22/2019     Requested Prescriptions   Pending Prescriptions Disp Refills     pravastatin (PRAVACHOL) 10 MG tablet [Pharmacy Med Name: Pravastatin Sodium 10 MG Oral Tablet] 90 tablet 0     Sig: TAKE 1 TABLET BY MOUTH ONCE DAILY       Statins Refill Protocol (Hmg CoA Reductase Inhibitors) Passed - 12/18/2019  8:24 PM        Passed - PCP or prescribing provider visit in past 12 months      Last office visit with prescriber/PCP: 8/28/2019 Mary Maya MD OR same dept: 8/28/2019 Mary Maya MD OR same specialty: 8/28/2019 Mary Maya MD  Last physical: 8/29/2018 Last MTM visit: Visit date not found   Next visit within 3 mo: Visit date not found  Next physical within 3 mo: Visit date not found  Prescriber OR PCP: Mary Maya MD  Last diagnosis associated with med order: 1. Mixed hyperlipidemia  - pravastatin (PRAVACHOL) 10 MG tablet [Pharmacy Med Name: Pravastatin Sodium 10 MG Oral Tablet]; TAKE 1 TABLET BY MOUTH ONCE DAILY  Dispense: 90 tablet; Refill: 0    If protocol passes may refill for 12 months if within 3 months of last provider visit (or a total of 15 months).

## 2021-06-04 NOTE — TELEPHONE ENCOUNTER
Refill Approved    Rx renewed per Medication Renewal Policy. Medication was last renewed on 4/30/19.    Tiffanie Guillen, [facility] Triage/Med Refill 12/25/2019     Requested Prescriptions   Pending Prescriptions Disp Refills     potassium chloride (MICRO-K) 10 mEq CR capsule [Pharmacy Med Name: Potassium Chloride ER 10 MEQ Oral Capsule Extended Release] 90 capsule 0     Sig: TAKE 1 CAPSULE BY MOUTH ONCE DAILY       Potassium Supplements Refill Protocol Passed - 12/23/2019  5:30 AM        Passed - PCP or prescribing provider visit in past 12 months       Last office visit with prescriber/PCP: 8/28/2019 Mary Maya MD OR same dept: 8/28/2019 Mary Maya MD OR same specialty: 8/28/2019 Mary Maya MD  Last physical: 8/29/2018 Last MTM visit: Visit date not found   Next visit within 3 mo: Visit date not found  Next physical within 3 mo: Visit date not found  Prescriber OR PCP: Mary Maya MD  Last diagnosis associated with med order: 1. Essential hypertension with goal blood pressure less than 130/80  - potassium chloride (MICRO-K) 10 mEq CR capsule [Pharmacy Med Name: Potassium Chloride ER 10 MEQ Oral Capsule Extended Release]; TAKE 1 CAPSULE BY MOUTH ONCE DAILY  Dispense: 90 capsule; Refill: 0    If protocol passes may refill for 12 months if within 3 months of last provider visit (or a total of 15 months).             Passed - Potassium level in last 12 months     Lab Results   Component Value Date    Potassium 4.2 08/27/2019

## 2021-06-08 NOTE — PROGRESS NOTES
Assessment/Plan:     Osbaldo Garcia is a 51 y.o. male  who presents for   Chief Complaint   Patient presents with     Annual Exam     pt. is fasting       1. Routine general medical examination at a health care facility  Normal exam    2. Gastroesophageal reflux disease with esophagitis  Continue proton pump inhibitor    3. Essential hypertension with goal blood pressure less than 130/80    - hydroCHLOROthiazide (HYDRODIURIL) 50 MG tablet; Take 50 mg by mouth.  Dispense: 90 tablet; Refill: 3  - losartan (COZAAR) 100 MG tablet; Take 100 mg by mouth.  Dispense: 90 tablet; Refill: 3  - potassium chloride (MICRO-K) 10 mEq CR capsule; Take 10 mEq by mouth.  Dispense: 90 capsule; Refill: 3  - Basic Metabolic Panel  - HM2(CBC w/o Differential)    4. Familial hypercholesterolemia    - atorvastatin (LIPITOR) 80 MG tablet; Take 1 tablet (80 mg total) by mouth daily. Take 80 mg by mouth.  Dispense: 90 tablet; Refill: 3  - Lipid Cascade    5. Mild depression    - sertraline (ZOLOFT) 100 MG tablet; TAKE ONE TABLET (100MG) BY MOUTH ONCE DAILY  Dispense: 90 tablet; Refill: 3    6. Screening for prostate cancer    - PSA (Prostatic-Specific Antigen), Annual Screen    7. Fatigue    - Thyroid Stimulating Hormone (TSH)  - Vitamin D, Total (25-Hydroxy)    8. Acute Myocarditis  History of near transmural MI found incidentally on cardiac MRI scan in 2015.    The following high BMI interventions were performed this visit: encouragement to exercise, weight monitoring and lifestyle education regarding diet     Routine health maintenance discussion:  No smoking, limited alcohol (7 or less servings per week), 5 fruits/veg servings per day, 200 minutes of exercise per week.  Daily calcium/vitamin D guidelines, bone health,colon cancer screening beginning at age 50.  Accident avoidance, sun screen.  Monthly testicular exam discussed.   Will follow up with patient regarding laboratory results obtained today.   Mary Maya MD    Subjective:      Osbaldo Garcia is a 51 y.o. male male who presents for an annual exam. The patient reports that there is not domestic violence in his life. He currently has no concerns and is doing well. He is fasting today. He does not want to be taking so many medications anymore, and would like to stop taking some if possible.    Insomnia: He continues to have troubles sleeping every night. He has not taken trazodone in the last couple days and reports he is not sure if it helps him sleep. He believes that it makes him groggy in the morning, and he has been late to work on a couple occasions. He continues using his CPAP machine for sleep apnea, but has not been using it recently because of a cold.     Hypertension: He continues taking 50 mg of hydrochlorothiazide and 25 mg of metoprolol succinate daily. He has his metoprolol refilled by cardiology and has a refill. He reports that his blood pressure has been under control. His work has a blood pressure machine and checks it occasionally. He also checks it when he is at [facility] or [facility]. His blood pressure is 134/70 in clinic today.    Obesity: He mentions that he gained weight following his knee surgery in September 2015, but has since lost it. He is up 2 lbs since his visit on 3/7/16. He reports that he does not eat healthy, and his diet consists of too much sugar and fat.     Health Maintenance: He is due to see dermatology. He takes vitamin D daily. He would like his seasonal flu shot today.    Healthy Habits:   Regular Exercise: No  Sunscreen Use: Yes  Healthy Diet: No  Dental Visits Regularly: Yes  Seat Belt: Yes  Sexually active: Not asked  Monthly Self Testicular Exams:  No  Hemoccults: No  Flex Sig: No  Colonoscopy: No  Lipid Profile: Yes  Glucose Screen: Yes  Prevention of Osteoporosis: Yes  Last Dexa: N/A  Guns at Home:  Not asked      Immunization History   Administered Date(s) Administered     Influenza, seasonal,quad inj 6-35 mos 09/17/2014     Influenza,seasonal  quad, PF, 36+MOS 12/04/2015, 01/23/2017     Vision Screening: Deferred   Hearing: Deferred      Current Outpatient Prescriptions   Medication Sig Dispense Refill     acetaminophen (TYLENOL) 500 MG tablet Take 500 mg by mouth.       ascorbic acid (VITAMIN C) 1000 MG tablet Take 1,000 mg by mouth daily.       atorvastatin (LIPITOR) 80 MG tablet Take 1 tablet (80 mg total) by mouth daily. Take 80 mg by mouth. 90 tablet 3     clopidogrel (PLAVIX) 75 mg tablet Take 75 mg by mouth. 90 tablet 3     ergocalciferol (VITAMIN D2) 50,000 unit capsule Take 50,000 Units by mouth daily.       hydroCHLOROthiazide (HYDRODIURIL) 50 MG tablet Take 50 mg by mouth. 90 tablet 3     losartan (COZAAR) 100 MG tablet Take 100 mg by mouth. 90 tablet 3     metoprolol succinate (TOPROL-XL) 25 MG TAKE ONE TABLET BY MOUTH ONCE DAILY *NEED  APPOINTMENT* 30 tablet 0     MULTIVIT &MINERALS/FERROUS FUM (MULTI VITAMIN ORAL) Take by mouth.       omeprazole (PRILOSEC) 20 MG capsule Take one capsule twice daily. 180 capsule 3     potassium chloride (MICRO-K) 10 mEq CR capsule Take 10 mEq by mouth. 90 capsule 3     sertraline (ZOLOFT) 100 MG tablet TAKE ONE TABLET (100MG) BY MOUTH ONCE DAILY 90 tablet 3     traZODone (DESYREL) 50 MG tablet TAKE 2 TABLETS BY MOUTH 1 HOUR BEFORE BEDTIME. 60 tablet 5     No current facility-administered medications for this visit.      Past Medical History   Diagnosis Date     H. pylori duodenitis      Myocardial infarction 2015     mid to distal inferolateral myocardial infarction with near transmural uptake of gadolinium.      Past Surgical History   Procedure Laterality Date     Cardiac catheterization  6/29/14     arteries clean     Knee surgery  09/19/2015     Quad tendon repair, off work August 7 - August 29 2016 due to injury     Mohs surgery  04/26/2016     Basal cell cancer back     Codeine and Latex  Family History   Problem Relation Age of Onset     Coronary artery disease Father      cardiac arrest age 50,  "survivied     Hypertension Sister      Social History     Social History     Marital status: Single     Spouse name: N/A     Number of children: N/A     Years of education: N/A     Occupational History     loads and unloads cargo [employer]     Social History Main Topics     Smoking status: Former Smoker     Smokeless tobacco: Former User     Quit date: 3/4/2014     Alcohol use Not on file     Drug use: Not on file     Sexual activity: Not on file     Other Topics Concern     Not on file     Social History Narrative    Currently lives with his sister Lizbeth and family.  Father Valeriano has dementia and now lives in a group home.     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 He recently put his father into a a private home due to memory issues.     Review of Systems  General:  Denies fever, chills, HA, fatigue, myalgias, weight change    Eyes: Denies vision changes   Ears/Nose/Throat: Denies nasal congestion, rhinorrhea, ear pain or discharge, sore throat, swollen glands  Cardiovascular: CP, palpitations  Respiratory:  SOB, cough  Gastrointestinal:  Denies changes in bowel habits, melena, rectal bleeding,  Genitourinary: Denies changes in urine habits/frequency/dysuria, hematuria   Musculoskeletal:  Denies joint swelling or erythema, edema  Skin: Denies rashes   Neurologic: Denies weakness, paresthesia  Psychiatric: Denies mood changes   Endocrine: Denies polyuria, polydipsia, polyphagia  Heme/Lymphatic: Denies problem with bleeding   Allergic/Immunologic: Denies problem     POSITIVES: He has had several spots taken off his skin that have not been cancerous. He has knee pain and reports that his left knee hurts almost as much as right knee. He gets stomach aches if he does not take omeprazole.     Objective:     Vitals:    01/23/17 1148   BP: 134/70   Pulse: 68   Resp: 16   Temp: 97.7  F (36.5  C)   TempSrc: Oral   Weight: (!) 257 lb (116.6 kg)   Height: 5' 11\" (1.803 m)       Physical  General Appearance: " Alert, cooperative, no distress, appears stated age  Head: Normocephalic, without obvious abnormality, atraumatic  Eyes: PERRL, conjunctiva/corneas clear, EOM's intact  Ears: Normal TM's and external ear canals, both ears  Nose: Nares normal, septum midline,mucosa normal, no drainage  Throat: Lips, mucosa, and tongue normal; teeth and gums normal  Neck: Supple, symmetrical, trachea midline, no adenopathy;  thyroid: not enlarged, symmetric, no tenderness/mass/nodules; no carotid bruit or JVD  Back: Symmetric, no curvature, ROM normal, no CVA tenderness  Lungs: Clear to auscultation bilaterally, respirations unlabored  Heart: Regular rate and rhythm, S1 and S2 normal, no murmur, rub, or gallop,  Abdomen: Soft, non-tender, bowel sounds active all four quadrants,  no masses, no organomegaly  Genitourinary: Penis normal. Right testis is descended. Left testis is descended.   Musculoskeletal: Normal range of motion. No joint swelling or deformity.   Extremities: Extremities normal, atraumatic, no cyanosis or edema  Skin: Skin color, texture, turgor normal, no rashes or lesions  Lymph nodes: Cervical, supraclavicular, and axillary nodes normal  Neurologic: He is alert. He has normal reflexes.   Psychiatric: He has a normal mood and affect.       Recent Results (from the past 240 hour(s))   PSA (Prostatic-Specific Antigen), Annual Screen   Result Value Ref Range    PSA 1.0 0.0 - 3.5 ng/mL   Basic Metabolic Panel   Result Value Ref Range    Sodium 142 136 - 145 mmol/L    Potassium 3.6 3.5 - 5.0 mmol/L    Chloride 108 (H) 98 - 107 mmol/L    CO2 23 22 - 31 mmol/L    Anion Gap, Calculation 11 5 - 18 mmol/L    Glucose 96 70 - 125 mg/dL    Calcium 9.2 8.5 - 10.5 mg/dL    BUN 15 8 - 22 mg/dL    Creatinine 0.94 0.70 - 1.30 mg/dL    GFR MDRD Af Amer >60 >60 mL/min/1.73m2    GFR MDRD Non Af Amer >60 >60 mL/min/1.73m2   Lipid Cascade   Result Value Ref Range    Cholesterol 191 <=199 mg/dL    Triglycerides 223 (H) <=149 mg/dL    HDL  Cholesterol 33 (L) >=40 mg/dL    LDL Calculated 113 <=129 mg/dL    Patient Fasting > 8hrs? Yes    HM2(CBC w/o Differential)   Result Value Ref Range    WBC 7.1 4.0 - 11.0 thou/uL    RBC 5.04 4.40 - 6.20 mill/uL    Hemoglobin 16.0 14.0 - 18.0 g/dL    Hematocrit 46.4 40.0 - 54.0 %    MCV 92 80 - 100 fL    MCH 31.8 27.0 - 34.0 pg    MCHC 34.5 32.0 - 36.0 g/dL    RDW 13.2 11.0 - 14.5 %    Platelets 228 140 - 440 thou/uL    MPV 7.8 7.0 - 10.0 fL   Thyroid Stimulating Hormone (TSH)   Result Value Ref Range    TSH 1.51 0.30 - 5.00 uIU/mL   Vitamin D, Total (25-Hydroxy)   Result Value Ref Range    Vitamin D, Total (25-Hydroxy) 29.3 (L) 30.0 - 80.0 ng/mL         ADDITIONAL HISTORY SUMMARIZED (2): None.  DECISION TO OBTAIN EXTRA INFORMATION (1): None.   RADIOLOGY TESTS (1): None.  LABS (1): Reviewed labs and ordered labs.  MEDICINE TESTS (1): None.  INDEPENDENT REVIEW (2 each): None.     The visit lasted a total of 32 minutes face to face with the patient. Over 50% of the time was spent counseling and educating the patient about annual physical exam.    I, Shelli Nur, am scribing for and in the presence of, Dr. Maya.    I, Dr. Maya, personally performed the services described in this documentation, as scribed by Shelli Nur in my presence, and it is both accurate and complete.    Total data points: 1  Mary Maya MD

## 2021-06-11 NOTE — PROGRESS NOTES
PROGRESS NOTE       SUBJECTIVE:  Osbaldo Garcia is a 52 y.o. male   Chief Complaint   Patient presents with     Recurrent Skin Infections     chronic, gave up using medication because felt like it wasn't helping much      Cystitis     some blood in urine, frequency in urination, some pain for the past month     patient states he stopped taking the trazodone.  Did not seem to work for him.  He is taking sertraline and we talked about how it keeps things in balance.  His PHQ 9 score is 1.  His yosvany 7 score is 0.  He feels good about this.    Patient is having difficulty with urination.  He states that he seems to have to go all the time.  He is noted blood in his urine occasionally.  It just has not been right for the past month.  He denies any fever chills and has had no flank pain.  He has had a history of kidney stones.  He has no difficulty with his urinary stream.  He has no history of prostatitis.      Patient also states he has chronic jock itch and would like medicine for it because Tinactin and Lotrimin simply are not effective.    Patient Active Problem List   Diagnosis     Hyperlipidemia     Acute Myocarditis     Hypertension     Abdominal Pain     Shortness Of Breath     MARY (obstructive sleep apnea)     Hematuria, microscopic     Basal cell carcinoma of neck       Current Outpatient Prescriptions   Medication Sig Dispense Refill     ascorbic acid (VITAMIN C) 1000 MG tablet Take 1,000 mg by mouth daily.       atorvastatin (LIPITOR) 80 MG tablet Take 1 tablet (80 mg total) by mouth daily. Take 80 mg by mouth. 90 tablet 3     clopidogrel (PLAVIX) 75 mg tablet Take 75 mg by mouth. 90 tablet 3     ergocalciferol (VITAMIN D2) 50,000 unit capsule Take 50,000 Units by mouth daily.       hydroCHLOROthiazide (HYDRODIURIL) 50 MG tablet Take 50 mg by mouth. 90 tablet 3     losartan (COZAAR) 100 MG tablet Take 100 mg by mouth. 90 tablet 3     metoprolol succinate (TOPROL-XL) 25 MG TAKE ONE TABLET BY MOUTH ONCE DAILY  *NEED  APPOINTMENT* 30 tablet 0     MULTIVIT &MINERALS/FERROUS FUM (MULTI VITAMIN ORAL) Take by mouth.       omeprazole (PRILOSEC) 20 MG capsule TAKE ONE CAPSULE BY MOUTH TWICE DAILY 180 capsule 2     potassium chloride (MICRO-K) 10 mEq CR capsule Take 10 mEq by mouth. 90 capsule 3     sertraline (ZOLOFT) 100 MG tablet TAKE ONE TABLET (100MG) BY MOUTH ONCE DAILY 90 tablet 3     acetaminophen (TYLENOL) 500 MG tablet Take 500 mg by mouth.       traZODone (DESYREL) 50 MG tablet TAKE 2 TABLETS BY MOUTH 1 HOUR BEFORE BEDTIME. 60 tablet 5     No current facility-administered medications for this visit.        History   Smoking Status     Former Smoker   Smokeless Tobacco     Former User     Quit date: 3/4/2014       REVIEW OF SYSTEMS:  Patient denies fever, chills, dizziness, headache, visual change, cough, chest pain, shortness of breath, abdominal pain, edema.     OBJECTIVE:       Vitals:    07/13/17 1544   BP: 132/86   Pulse: 66     Weight: 248 lb 8 oz (112.7 kg)  Wt Readings from Last 3 Encounters:   07/13/17 (!) 248 lb 8 oz (112.7 kg)   01/23/17 (!) 257 lb (116.6 kg)   03/28/16 (!) 257 lb (116.6 kg)     Body mass index is 34.66 kg/(m^2).        Physical Exam:  GENERAL APPEARANCE: A&A, NAD, well hydrated, well nourished.  He has lost 9 pounds, which is good.  SKIN:  Normal skin turgor, no lesions/rashes   EARS: TM's normal, gray with nl light reflex  OROPHARYNX: without erythema, no post nasal drainage or thrush  NECK: Supple, without lymphadenopathy, no thyroid mass  CV: RRR, no M/G/R   LUNGS: CTAB, normal respiratory effort  ABDOMEN: S&NT, no masses, no organomegaly, no suprapubic pain and no flank pain to percussion  EXTREMITY: no edema   NEURO: no gross deficits   PSYCHIATRIC:  Mood appropriate, memory intact        ASSESSMENT/PLAN:     1. Hematuria  There is microscopic blood.  He should keep me posted on whether he has pain related to stones.  He declines a CT scan.  Prefers to avoid the extra radiation if  possible.  - Urinalysis Macroscopic  - Culture, Urine    2. Tinea cruris  We will treat with pulsatile Sporanox: 200 mg twice in 1 day every 2 weeks ×4.    We also talked about his father's recent death.  I had read the obituary in the [place] paper.    There are no Patient Instructions on file for this visit.  There are no discontinued medications.  No Follow-up on file.    The visit lasted a total of 28 minutes face to face with the patient.  Over 50% of the time spent counseling and educating the patient about all of the above.      Mary Maya MD

## 2021-06-13 NOTE — TELEPHONE ENCOUNTER
Last Med Check: 8/12/19.    Next med check due on: ?    Future Appointment Scheduled ? No.     Last Med Refill? 8/12/19.    Kathryn Cash, CMA

## 2021-06-13 NOTE — TELEPHONE ENCOUNTER
RN cannot approve Refill Request    RN can NOT refill this medication Protocol failed and NO refill given. Last office visit: 8/28/2019 Mary Maya MD Last Physical: 8/29/2018 Last MTM visit: Visit date not found Last visit same specialty: 8/28/2019 Mary Maya MD.  Next visit within 3 mo: Visit date not found  Next physical within 3 mo: Visit date not found      Kamala Santa, [facility] Connection Triage/Med Refill 11/17/2020    Requested Prescriptions   Pending Prescriptions Disp Refills     pantoprazole (PROTONIX) 40 MG tablet [Pharmacy Med Name: Pantoprazole Sodium 40 MG Oral Tablet Delayed Release] 90 tablet 0     Sig: Take 1 tablet by mouth once daily       GI Medications Refill Protocol Failed - 11/16/2020  2:37 PM        Failed - PCP or prescribing provider visit in last 12 or next 3 months.     Last office visit with prescriber/PCP: 8/28/2019 Mary Maya MD OR same dept: Visit date not found OR same specialty: 8/28/2019 Mary Maya MD  Last physical: 8/29/2018 Last MTM visit: Visit date not found   Next visit within 3 mo: Visit date not found  Next physical within 3 mo: Visit date not found  Prescriber OR PCP: Mary Maya MD  Last diagnosis associated with med order: 1. GERD (gastroesophageal reflux disease)  - pantoprazole (PROTONIX) 40 MG tablet [Pharmacy Med Name: Pantoprazole Sodium 40 MG Oral Tablet Delayed Release]; Take 1 tablet by mouth once daily  Dispense: 90 tablet; Refill: 0    If protocol passes may refill for 12 months if within 3 months of last provider visit (or a total of 15 months).

## 2021-06-13 NOTE — TELEPHONE ENCOUNTER
Pt notified. He is thinking of setting up care closer to his home. He was notified that a 30 day supply was sent to pharmacy however we will not be able to continue further refills.

## 2021-06-13 NOTE — TELEPHONE ENCOUNTER
30 day rx sent.  Please call patient. >1 year since seen. Please help schedule for a physical.    Long Lundberg, CNP

## 2021-06-13 NOTE — PROGRESS NOTES
PROGRESS NOTE       SUBJECTIVE:  Osbaldo Garcia is a 52 y.o. male   Chief Complaint   Patient presents with     Abdominal Pain     upset stomach, no constipation or diarrhea      achey joints     body aches all over, pt has physical job, works through ,      Hearing Problem     This 52-year-old patient with a history of familial hyperlipidemia, hypertension and obstructive sleep apnea presents with abdominal pain.  He states that his stomach bothers him 95% of the time.   He complains of nausea and epigastric burning.  He takes Tums and Gaviscon and it seems much better.  Stools are the same.  No black or tarry stools.  He sees no bloody stools.  Occasional diarrhea which he describes as watery.  But they are not a problem.  He had colonoscopy done at age 46.    He has a cardiology appointment with Dr. Konstantin Sanches at [facility] on December 12, 2017.    He wonders if there is a problem with his statin.  He just aches all over.  And it seems to be in the muscles not his joints.  He is taking 80 mg of atorvastatin daily.  He has been on it for almost 2 years.    Patient Active Problem List   Diagnosis     Hyperlipidemia     Acute Myocarditis     Hypertension     Abdominal Pain     Shortness Of Breath     MARY (obstructive sleep apnea)     Hematuria, microscopic     Basal cell carcinoma of neck       Current Outpatient Prescriptions   Medication Sig Dispense Refill     ascorbic acid (VITAMIN C) 1000 MG tablet Take 1,000 mg by mouth daily.       atorvastatin (LIPITOR) 80 MG tablet Take 1 tablet (80 mg total) by mouth daily. Take 80 mg by mouth. 90 tablet 3     cholecalciferol, vitamin D3, 1,000 unit tablet Take 1,000 Units by mouth.       clopidogrel (PLAVIX) 75 mg tablet Take 75 mg by mouth. 90 tablet 3     hydroCHLOROthiazide (HYDRODIURIL) 50 MG tablet Take 50 mg by mouth. 90 tablet 3     losartan (COZAAR) 100 MG tablet Take 100 mg by mouth. 90 tablet 3     metoprolol succinate (TOPROL-XL) 25 MG TAKE ONE TABLET BY  MOUTH ONCE DAILY *NEED  APPOINTMENT* 30 tablet 0     MULTIVIT &MINERALS/FERROUS FUM (MULTI VITAMIN ORAL) Take by mouth.       potassium chloride (MICRO-K) 10 mEq CR capsule Take 10 mEq by mouth. 90 capsule 3     ranitidine (ZANTAC) 150 MG tablet Take 1 tablet (150 mg total) by mouth 2 (two) times a day.       sertraline (ZOLOFT) 100 MG tablet TAKE ONE TABLET (100MG) BY MOUTH ONCE DAILY 90 tablet 3     acetaminophen (TYLENOL) 500 MG tablet Take 500 mg by mouth.       ergocalciferol (VITAMIN D2) 50,000 unit capsule Take 50,000 Units by mouth daily.       itraconazole (SPORANOX) 100 mg capsule Take two capsules twice in one day, for one day every 2 weeks (4 treatments) 16 capsule 0     traZODone (DESYREL) 50 MG tablet TAKE 2 TABLETS BY MOUTH 1 HOUR BEFORE BEDTIME. 60 tablet 5     No current facility-administered medications for this visit.        History   Smoking Status     Former Smoker   Smokeless Tobacco     Former User     Quit date: 3/4/2014       REVIEW OF SYSTEMS:  Patient denies fever, chills, dizziness, headache, visual change, cough, chest pain, shortness of breath,  edema.     Positives: Abdominal pain in the epigastrium as noted above, muscle aches      OBJECTIVE:       Vitals:    10/18/17 1552   BP: 118/78   Pulse: 64     Weight: 245 lb (111.1 kg)  Wt Readings from Last 3 Encounters:   10/18/17 (!) 245 lb (111.1 kg)   07/13/17 (!) 248 lb 8 oz (112.7 kg)   01/23/17 (!) 257 lb (116.6 kg)     Body mass index is 34.17 kg/(m^2).      ASSESSMENT AND PLAN  1. Chest discomfort in June 2014 with labile ST elevation mainly in the inferior lead and wall motion abnormality noted on the echocardiogram, and MRI most consistent with myocardial infarction. Patient coronary angiography did not show any evidence of significant coronary artery disease. The etiology of the patient's myocardial infarction is not clear and is not clear whether the patient had vasospasm versus coronary thromboses that lysed versus an embolic  event.  Patient had a limited echocardiogram with bubble study that was negative for PFO.     In case the patient had coronary spasm, aspirin is somewhat contraindicated and patient has been switched from aspirin to Plavix. Also beta blockers are somewhat contraindicated and if the patient has any recurrent chest discomfort I may consider stopping Toprol XL and starting the patient on a calcium channel blocker. For now the patient has not had any significant chest discomfort and would keep on the same medication.  2. Dyspnea on exertion, probably related to deconditioning, patient is feeling better since he started exercising.   3. Hypertension seems to be relatively well-controlled.    4. Overweight and the patient was encouraged to lose weight.    5. gastroesophageal reflux which seems to have improved with medications.  6. Hyperlipidemia, will recheck cholesterol profile.  7. Sleep apnea using a CPAP machine.  8. Patient to follow-up in my clinic in about one year or earlier if needed.  This dictation was done with dragon speech recognition software and there may be unintended substitution of words in the dictation    Konstantin Smart MD 12/14/2016 3:45 PM          Physical Exam:  GENERAL APPEARANCE: A&A, NAD, well hydrated, well nourished  SKIN:  Normal skin turgor, no lesions/rashes   EARS: TM's normal, gray with nl light reflex  OROPHARYNX: without erythema, no post nasal drainage or thrush  NECK: Supple, without lymphadenopathy, no thyroid mass  CV: RRR, no M/G/R   LUNGS: CTAB, normal respiratory effort  ABDOMEN: Minimally tender in the epigastrium.  No organomegaly.  Bowel sounds are normal and active.  EXTREMITY: no edema, big muscles are sore to palpation  NEURO: no gross deficits   PSYCHIATRIC:  Mood appropriate, memory intact        ASSESSMENT/PLAN:     1. Screen for colon cancer  I recommended he see GI for follow-up colonoscopy and consideration for endoscopy.  - Ambulatory referral for Colonoscopy  -  Ambulatory referral to Gastroenterology    2. Stomach pain  We will begin Zantac 300 mg daily.  He should be significantly better in 2 weeks.  If this is the case he should continue for another month.  - Ambulatory referral to Gastroenterology    3. Myalgia  We will rule out myositis, which can be caused by his high-dose statin.  - CK Total    4. Flu vaccine need    - Influenza, Seasonal,Quad Inj, 36+ MOS    5. Familial hypercholesterolemia      6. Hypertension  Good control    7. MARY (obstructive sleep apnea)  Uses CPAP      Medications Discontinued During This Encounter   Medication Reason     traZODone (DESYREL) 50 MG tablet Therapy completed     itraconazole (SPORANOX) 100 mg capsule Therapy completed     acetaminophen (TYLENOL) 500 MG tablet Therapy completed     ranitidine (ZANTAC) 150 MG tablet      Return in about 3 months (around 1/18/2018) for Recheck.  We will see him back after he sees GI and cardiology.    The visit lasted a total of 30 minutes face to face with the patient.  Over 50% of the time spent counseling and educating the patient about all of the above.      Mary Maya MD

## 2021-06-16 PROBLEM — I25.10 CAD (CORONARY ARTERY DISEASE): Status: ACTIVE | Noted: 2018-02-26

## 2021-06-16 NOTE — PROGRESS NOTES
" PROGRESS NOTE       SUBJECTIVE:  Osbaldo Garcia is a 52 y.o. male   Chief Complaint   Patient presents with     Medication Refill     med check and refills , fasting labs      Patient is here today for lab work and refills on his medications.  He generally is feeling fine but feels bad that his diet is \"atrocious\".  He basically does not cook at home and he realizes that he needs to make it healthy.  He also finds it very difficult to exercise during the winter.  On the other hand, patient managed to quit smoking.  His last day was December 25, 2017.  He cannot say that he feels any better.  We talked about this.  (It may take a year for him to really feel better after quitting smoking).    Patient has a rash that has been itchy and he has been applying triamcinolone on his arms.  This just been this winter.  He has never had it before but it is quite itchy.    He is 3 days into hearing aids and is finding that he is hearing a lot that he had been missing out on for a long time.  Care Team            Mary Maya MD PCP - General, Family Medicine    [phone]     Konstantin Smart MD Cardiology    [phone] [facility]        NOTE from [facility] 2016:  SUBJECTIVE: Osbaldo Garcia is a 52 y.o. old male with past medical history significant for Hypertension, history of chest discomfort With wall motion abnormality on the echocardiogram Who is here for follow-up.    Patient was admitted in June of 2014 with chest discomfort radiating to his back and upper extremities, his electrocardiogram had some labile ST elevation mainly in the inferior lead possibly suggestive of early repolarization versus pericarditis versus acute injury. Patient had an echocardiogram in June of 2014 which I reviewed myself which was technically difficult but that was suggestive of a focal wall motion abnormality in the circumflex territory involving the mid lateral wall and posterior wall. Due to the abnormality on the " echocardiogram and persistent chest discomfort the patient was taken to the cardiac catheterization lab and apparently no angiographic coronary artery disease was noted, left ventriculography did not report any focal wall motion abnormality but it was not done in biplane. Patient started having worsening shortness of breath after the admission, patient was noted to have significant elevation in blood pressure when he was in the hospital and he was started on low-dose beta blocker and lisinopril also was started on hydrochlorothiazide. It was not clear what caused the patient's symptoms and event and possibility of vasospasm versus acute coronary syndrome with lysis of the clot versus focal myocarditis versus focal pericarditis was contemplated. Patient followed up with one of our physician assistant and he was complaining off increased shortness of breath and his hydrochlorothiazide dose was doubled and his shortness of breath improved some.    Patient was diagnosed with Helicobacter pylori and he was treated for H bilaterally and started on Prilosec.     Since it was not clear what kind of event the patient had, the patient had a cardiac MRI which showed:  mid to distal inferolateral myocardial infarction with near transmural uptake of gadolinium. Left ventricular function is preserved.  Basically the cardiac MRI was consistent with previous myocardial infarction and this has been discussed with the patient.    ASSESSMENT AND PLAN  1. Chest discomfort in June 2014 with labile ST elevation mainly in the inferior lead and wall motion abnormality noted on the echocardiogram, and MRI most consistent with myocardial infarction. Patient coronary angiography did not show any evidence of significant coronary artery disease. The etiology of the patient's myocardial infarction is not clear and is not clear whether the patient had vasospasm versus coronary thromboses that lysed versus an embolic event.  Patient had a limited  echocardiogram with bubble study that was negative for PFO.     In case the patient had coronary spasm, aspirin is somewhat contraindicated and patient has been switched from aspirin to Plavix. Also beta blockers are somewhat contraindicated and if the patient has any recurrent chest discomfort I may consider stopping Toprol XL and starting the patient on a calcium channel blocker. For now the patient has not had any significant chest discomfort and would keep on the same medication.  2. Dyspnea on exertion, probably related to deconditioning, patient is feeling better since he started exercising.   3. Hypertension seems to be relatively well-controlled.    4. Overweight and the patient was encouraged to lose weight.    5. gastroesophageal reflux which seems to have improved with medications.  6. Hyperlipidemia, will recheck cholesterol profile.  7. Sleep apnea using a CPAP machine.  8. Patient to follow-up in my clinic in about one year or earlier if needed.  This dictation was done with dragon speech recognition software and there may be unintended substitution of words in the dictation    Konstantin mSart MD 12/14/2016 3:45 PM      Patient Active Problem List   Diagnosis     Hyperlipidemia     Acute Myocarditis     Hypertension     Abdominal Pain     Shortness Of Breath     MARY (obstructive sleep apnea)     Hematuria, microscopic     Basal cell carcinoma of neck       Current Outpatient Prescriptions   Medication Sig Dispense Refill     ascorbic acid (VITAMIN C) 1000 MG tablet Take 1,000 mg by mouth daily.       cholecalciferol, vitamin D3, 1,000 unit tablet Take 1,000 Units by mouth.       clopidogrel (PLAVIX) 75 mg tablet TAKE ONE TABLET BY MOUTH ONCE DAILY 90 tablet 3     garlic 500 mg cap        hydroCHLOROthiazide (HYDRODIURIL) 50 MG tablet Take 50 mg by mouth. 90 tablet 3     losartan (COZAAR) 100 MG tablet TAKE ONE TABLET BY MOUTH ONCE DAILY 90 tablet 3     metoprolol succinate (TOPROL-XL) 25 MG TAKE ONE  TABLET BY MOUTH ONCE DAILY *NEED  APPOINTMENT* 30 tablet 0     MULTIVIT &MINERALS/FERROUS FUM (MULTI VITAMIN ORAL) Take by mouth.       nitroglycerin (NITROSTAT) 0.4 MG SL tablet Place 0.4 mg under the tongue.       pantoprazole (PROTONIX) 40 MG tablet Take by mouth.       potassium chloride (MICRO-K) 10 mEq CR capsule TAKE ONE CAPSULE BY MOUTH ONCE DAILY 90 capsule 3     pravastatin (PRAVACHOL) 10 MG tablet Take 10 mg by mouth.       sertraline (ZOLOFT) 100 MG tablet TAKE ONE TABLET BY MOUTH ONCE DAILY 90 tablet 3     atorvastatin (LIPITOR) 80 MG tablet Take 1 tablet (80 mg total) by mouth daily. Take 80 mg by mouth. 90 tablet 3     ergocalciferol (VITAMIN D2) 50,000 unit capsule Take 50,000 Units by mouth daily.       Lactobacillus acidophilus (BACID) 10 billion cell capsule Take by mouth.       ranitidine (ZANTAC) 300 MG tablet Take 1 tablet (300 mg total) by mouth at bedtime. 90 tablet 1     No current facility-administered medications for this visit.        History   Smoking Status     Former Smoker   Smokeless Tobacco     Former User     Quit date: 3/4/2014       REVIEW OF SYSTEMS:  Patient denies fever, chills, dizziness, headache, visual change, cough, chest pain, shortness of breath, abdominal pain, extremity pain or swelling.    OBJECTIVE:       Vitals:    02/26/18 1101   BP: 118/80   Pulse: 73   SpO2: 98%     Weight: 250 lb (113.4 kg)  Wt Readings from Last 3 Encounters:   02/26/18 (!) 250 lb (113.4 kg)   10/18/17 (!) 245 lb (111.1 kg)   07/13/17 (!) 248 lb 8 oz (112.7 kg)     Body mass index is 34.87 kg/(m^2).        Physical Exam:  GENERAL APPEARANCE: A&A, NAD, well hydrated, well nourished  SKIN:  Normal skin turgor, excoriated lesions that are 3 mm in diameter for the extensor surface of the elbows.  (This may be consistent with herpetiformis)  EARS: TM's normal, gray with nl light reflex  OROPHARYNX: without erythema, no post nasal drainage or thrush  NECK: Supple, without lymphadenopathy, no thyroid  mass  CV: RRR, no M/G/R   LUNGS: CTAB, normal respiratory effort  EXTREMITY: Extremities normal, atraumatic, no swelling  NEURO: no gross deficits   PSYCHIATRIC:  Mood appropriate, memory intact        ASSESSMENT/PLAN:   1. Hypertension  Well-controlled continue same medications  - metoprolol succinate (TOPROL-XL) 25 MG; TAKE ONE TABLET BY MOUTH ONCE DAILY  Dispense: 30 tablet; Refill: 0  - Basic Metabolic Panel  - Lipid Cascade  - HM2(CBC w/o Differential)    2. Myositis  Has had elevated CPK previously 1 on statins.  While he has no symptoms this time he would like to have this checked.  - CK Total    3. Familial hypercholesterolemia  Continue atorvastatin low dose for now.    4. CAD (coronary artery disease)  We talked about eating a healthy diet.  This really means to limit carbs and processed food.  Unfortunately, this is what he eats.  Discussed strategies to turn it around.  It would be wise for him to lose 10 pounds this year.    5. Rash  Patient will continue to treat his rash with triamcinolone since it seems to be helping.  He declined testing for celiac sprue since it is not in his family.  And he wants to keep the cost down.  If it fails to improve we will need to do further testing and/or see dermatology.      There are no Patient Instructions on file for this visit.  Medications Discontinued During This Encounter   Medication Reason     metoprolol succinate (TOPROL-XL) 25 MG Reorder     pantoprazole (PROTONIX) 40 MG tablet Reorder     pravastatin (PRAVACHOL) 10 MG tablet Reorder     Return in about 6 months (around 8/26/2018) for medication check.    The visit lasted a total of 25 minutes face to face with the patient.  Over 50% of the time spent counseling and educating the patient about all of the above.      Mary Maya MD

## 2021-06-19 NOTE — LETTER
Letter by Mary Maya MD at      Author: Mary Maya MD Service: -- Author Type: --    Filed:  Encounter Date: 8/23/2019 Status: (Other)         Osbaldo Garcia  [id number] [place] [address]             August 23, 2019         Dear Mr. Garcia,    Below are the results from your recent visit:    Resulted Orders   Lipid Cascade   Result Value Ref Range    Cholesterol 225 (H) <=199 mg/dL    Triglycerides 198 (H) <=149 mg/dL    HDL Cholesterol 36 (L) >=40 mg/dL    LDL Calculated 149 (H) <=129 mg/dL    Patient Fasting > 8hrs? Yes    Comprehensive Metabolic Panel   Result Value Ref Range    Sodium 140 136 - 145 mmol/L    Potassium 4.1 3.5 - 5.0 mmol/L    Chloride 102 98 - 107 mmol/L    CO2 28 22 - 31 mmol/L    Anion Gap, Calculation 10 5 - 18 mmol/L    Glucose 123 70 - 125 mg/dL    BUN 25 (H) 8 - 22 mg/dL    Creatinine 1.05 0.70 - 1.30 mg/dL    GFR MDRD Af Amer >60 >60 mL/min/1.73m2    GFR MDRD Non Af Amer >60 >60 mL/min/1.73m2    Bilirubin, Total 0.4 0.0 - 1.0 mg/dL    Calcium 10.2 8.5 - 10.5 mg/dL    Protein, Total 7.5 6.0 - 8.0 g/dL    Albumin 4.5 3.5 - 5.0 g/dL    Alkaline Phosphatase 85 45 - 120 U/L    AST 17 0 - 40 U/L    ALT 26 0 - 45 U/L    Narrative    Fasting Glucose reference range is 70-99 mg/dL per  American Diabetes Association (ADA) guidelines.   HIV Antigen/Antibody Diagnostic Cascade   Result Value Ref Range    HIV Antigen / Antibody Negative Negative    Narrative    Method is Abbott HIV Ag/Ab for the detection of HIV p24 antigen, HIV-1 antibodies and HIV-2 antibodies.   HM1 (CBC with Diff)   Result Value Ref Range    WBC 7.4 4.0 - 11.0 thou/uL    RBC 5.28 4.40 - 6.20 mill/uL    Hemoglobin 17.4 14.0 - 18.0 g/dL    Hematocrit 50.4 40.0 - 54.0 %    MCV 95 80 - 100 fL    MCH 32.9 27.0 - 34.0 pg    MCHC 34.5 32.0 - 36.0 g/dL    RDW 11.6 11.0 - 14.5 %    Platelets 265 140 - 440 thou/uL    MPV 8.1 7.0 - 10.0 fL    Neutrophils % 59 50 - 70 %    Lymphocytes % 31 20 - 40 %    Monocytes % 7 2 - 10  %    Eosinophils % 2 0 - 6 %    Basophils % 1 0 - 2 %    Neutrophils Absolute 4.3 2.0 - 7.7 thou/uL    Lymphocytes Absolute 2.3 0.8 - 4.4 thou/uL    Monocytes Absolute 0.5 0.0 - 0.9 thou/uL    Eosinophils Absolute 0.2 0.0 - 0.4 thou/uL    Basophils Absolute 0.1 0.0 - 0.2 thou/uL       Come see me in about 3-6 months and we should recheck on how you are doing.    Please call with questions.     Sincerely,        Electronically signed by Mary Maya MD

## 2021-06-20 NOTE — PROGRESS NOTES
Assessment/Plan:     Osbaldo Garcia is a 53 y.o. male  who presents for   Chief Complaint   Patient presents with     Annual Exam     physical and fastings labs        1. Routine general medical examination at a health care facility    - Comprehensive Metabolic Panel  - HM2(CBC w/o Differential)  - Thyroid Stimulating Hormone (TSH)    2. CAD (coronary artery disease)    - Electrocardiogram Perform - Clinic    3. Familial hypercholesterolemia  We talked about possibly adding cholestyramine to his regimen to help bring his cholesterol down.  I also talked quite frankly with him that the medicines work better if he is eating healthy way.  He needs to focus on changing his eating habits by not skipping meals and getting rid of simple sugars and processed foods.  Losing weight will help him have less joint pain during his work.  - Lipid [place]    4. Hypertension  Not always compliant with medications    5. Screening for prostate cancer    - PSA (Prostatic-Specific Antigen), Annual Screen        Routine health maintenance discussion:  No smoking, limited alcohol (7 or less servings per week), 5 fruits/veg servings per day, 200 minutes of exercise per week.  Daily calcium/vitamin D guidelines, bone health,colon cancer screening beginning at age 50.  Accident avoidance, sun screen.  Monthly testicular exam discussed.   Will follow up with patient regarding laboratory results obtained today.     Mary Maya MD      Subjective:     Osbaldo Garcia is a 53 y.o. male male who presents for an annual exam. The patient reports that there is not domestic violence in his life. He currently has no concerns and is doing well.    I copied the cardiology note from December 2017, Dr. Smart:    ASSESSMENT AND PLAN  1. Chest discomfort in June 2014 with labile ST elevation mainly in the inferior lead and wall motion abnormality noted on the echocardiogram, and MRI most consistent with myocardial infarction. Patient coronary  angiography did not show any evidence of angiographic coronary artery disease. The etiology of the patient's myocardial infarction is not clear and is not clear whether the patient had vasospasm versus coronary thromboses that lysed versus an embolic event.  Patient had a limited echocardiogram with bubble study that was negative for PFO.     In case the patient had coronary spasm, aspirin is somewhat contraindicated and patient has been switched from aspirin to Plavix. Also beta blockers are somewhat contraindicated and if the patient has any recurrent symptoms I may consider stopping Toprol XL and starting the patient on a calcium channel blocker. Patient did not tolerate Lipitor and he does not want to go on statins. Patient just had a cholesterol profile and the results are pending. Now the patient is having some reflux symptoms which unfortunately are going to confuse the issue. Patient was instructed if he has any recurrent chest discomfort similar to June 2014 to go to the emergency room and get an electrocardiogram.    Patient states that he has no concerns regarding his heart.  He has had no exertional chest pain or shortness of breath.  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  He does admit that he does not always take his medication.  We talked about how important it is to manage his blood pressure to keep the load off his heart and to focus on healthy diet rather than filling his coffee with Pamela cream.  He has a history of elevated CPK with symptomatic myositis when on statin.  He has been taking 10 mg of pravastatin without difficulty, but not regularly.      Healthy Habits:   Regular Exercise: No  Sunscreen Use: Yes  Healthy Diet: Yes  Dental Visits Regularly: Yes  Seat Belt: Yes  Sexually active: n/a  Monthly Self Testicular Exams:  Yes  Hemoccults: No  Flex Sig: No  Colonoscopy: Yes 2017Lipid Profile: Yes  Glucose Screen: Yes  Prevention of Osteoporosis:  Yes  Last Dexa: No  Guns at Home:  No      Immunization History   Administered Date(s) Administered     Influenza, seasonal,quad inj 36+ mos 10/18/2017     Influenza, seasonal,quad inj 6-35 mos 09/17/2014     Influenza,seasonal quad, PF, 36+MOS 12/04/2015, 01/23/2017     ZOSTER, RECOMBINANT, IM 07/03/2016     Vision Screening: Deferred   Hearing: Deferred      Current Outpatient Prescriptions   Medication Sig Dispense Refill     ascorbic acid (VITAMIN C) 1000 MG tablet Take 1,000 mg by mouth daily.       cholecalciferol, vitamin D3, 1,000 unit tablet Take 1,000 Units by mouth.       clopidogrel (PLAVIX) 75 mg tablet TAKE ONE TABLET BY MOUTH ONCE DAILY 90 tablet 3     garlic 500 mg cap        hydroCHLOROthiazide (HYDRODIURIL) 50 MG tablet TAKE ONE TABLET BY MOUTH ONCE DAILY 90 tablet 2     Lactobacillus acidophilus (BACID) 10 billion cell capsule Take by mouth.       losartan (COZAAR) 100 MG tablet TAKE ONE TABLET BY MOUTH ONCE DAILY 90 tablet 3     metoprolol succinate (TOPROL-XL) 25 MG Take 1 tablet (25 mg total) by mouth daily. 30 tablet 11     MULTIVIT &MINERALS/FERROUS FUM (MULTI VITAMIN ORAL) Take by mouth.       pantoprazole (PROTONIX) 40 MG tablet Take 1 tablet (40 mg total) by mouth daily. 90 tablet 3     potassium chloride (MICRO-K) 10 mEq CR capsule TAKE ONE CAPSULE BY MOUTH ONCE DAILY 90 capsule 3     pravastatin (PRAVACHOL) 10 MG tablet Take 1 tablet (10 mg total) by mouth daily. 90 tablet 3     atorvastatin (LIPITOR) 80 MG tablet Take 1 tablet (80 mg total) by mouth daily. Take 80 mg by mouth. 90 tablet 3     nitroglycerin (NITROSTAT) 0.4 MG SL tablet Place 0.4 mg under the tongue.       ranitidine (ZANTAC) 300 MG tablet Take 1 tablet (300 mg total) by mouth at bedtime. (Patient not taking: Reported on 8/29/2018) 90 tablet 1     sertraline (ZOLOFT) 100 MG tablet TAKE ONE TABLET BY MOUTH ONCE DAILY (Patient not taking: Reported on 8/29/2018) 90 tablet 3     No current facility-administered medications  for this visit.      Past Medical History:   Diagnosis Date     H. pylori duodenitis      Myocardial infarction 2015    mid to distal inferolateral myocardial infarction with near transmural uptake of gadolinium.      Past Surgical History:   Procedure Laterality Date     CARDIAC CATHETERIZATION  6/29/14    arteries clean     KNEE SURGERY  09/19/2015    Quad tendon repair, off work August 7 - August 29 2016 due to injury     MOHS SURGERY  04/26/2016    Basal cell cancer back     Glucosamine; Atorvastatin; Codeine; Shellfish derived; and Latex  Family History   Problem Relation Age of Onset     Coronary artery disease Father      cardiac arrest age 50, survivied, d. summer 2017     Hypertension Sister      Social History     Social History     Marital status: Single     Spouse name: N/A     Number of children: N/A     Years of education: N/A     Occupational History     loads and unloads cargo [employer]     Social History Main Topics     Smoking status: Former Smoker     Smokeless tobacco: Former User     Quit date: 3/4/2014     Alcohol use Not on file     Drug use: Not on file     Sexual activity: Not on file     Other Topics Concern     Not on file     Social History Narrative    Currently lives with his sister Lizbeth and family.  Father Valeriano has dementia and now lives in a group home.         Review of Systems  General:  Denies fever, chills, HA, fatigue, myalgias, weight change    Eyes: Denies vision changes   Ears/Nose/Throat: Denies nasal congestion, rhinorrhea, ear pain or discharge, sore throat, swollen glands  Cardiovascular: CP, palpitations  Respiratory:  SOB, cough  Gastrointestinal:  Denies changes in bowel habits, melena, rectal bleeding,  Genitourinary: Denies changes in urine habits/frequency/dysuria, hematuria   Musculoskeletal:  Denies  joint pain or swelling or erythema, edema  Skin: Denies rashes   Neurologic: Denies weakness, paresthesia  Psychiatric: Denies mood changes   Endocrine:  "Denies polyuria, polydipsia, polyphagia  Heme/Lymphatic: Denies problem with bleeding   Allergic/Immunologic: Denies problem    POSITIVES: Hearing loss, wears hearing aids.    Objective:     Vitals:    08/29/18 1020 08/29/18 1022   BP: 118/90 118/86   Pulse: 80    Temp: 98  F (36.7  C)    Weight: (!) 255 lb (115.7 kg)    Height: 5' 11\" (1.803 m)        Physical  General Appearance: Alert, cooperative, no distress, appears stated age  Head: Normocephalic, without obvious abnormality, atraumatic  Eyes: PERRL, conjunctiva/corneas clear, EOM's intact  Ears: Normal TM's and external ear canals, both ears  Nose: Nares normal, septum midline,mucosa normal, no drainage  Throat: Lips, mucosa, and tongue normal; teeth and gums normal  Neck: Supple, symmetrical, trachea midline, no adenopathy;  thyroid: not enlarged, symmetric, no tenderness/mass/nodules; no carotid bruit or JVD  Back: Symmetric, no curvature, ROM normal, no CVA tenderness  Lungs: Clear to auscultation bilaterally, respirations unlabored  Heart: Regular rate and rhythm, S1 and S2 normal, no murmur, rub, or gallop,  Abdomen: Soft, non-tender, bowel sounds active all four quadrants,  no masses, no organomegaly  Genitourinary: Penis normal. Right testis is descended. Left testis is descended.   RECTAL:  Normal sphincter tone, prostate soft, no masses, small  Musculoskeletal: Normal range of motion. No joint swelling or deformity.   Extremities: Extremities normal, atraumatic, no swelling  Skin: Skin color, texture, turgor normal, no rashes or lesions  Lymph nodes: Cervical, supraclavicular, and axillary nodes normal  Neurologic: He is alert. He has normal reflexes.   Psychiatric: He has a normal mood and affect.       Recent Results (from the past 240 hour(s))   Electrocardiogram Perform - Clinic   Result Value Ref Range    SYSTOLIC BLOOD PRESSURE  mmHg    DIASTOLIC BLOOD PRESSURE  mmHg    VENTRICULAR RATE 54 BPM    ATRIAL RATE 54 BPM    P-R INTERVAL 128 ms    " QRS DURATION 90 ms    Q-T INTERVAL 430 ms    QTC CALCULATION (BEZET) 407 ms    P Axis 43 degrees    R AXIS 37 degrees    T AXIS 19 degrees    MUSE DIAGNOSIS       Sinus bradycardia  Otherwise normal ECG  No previous ECGs available     HM2(CBC w/o Differential)   Result Value Ref Range    WBC 6.5 4.0 - 11.0 thou/uL    RBC 4.99 4.40 - 6.20 mill/uL    Hemoglobin 15.6 14.0 - 18.0 g/dL    Hematocrit 45.9 40.0 - 54.0 %    MCV 92 80 - 100 fL    MCH 31.2 27.0 - 34.0 pg    MCHC 33.9 32.0 - 36.0 g/dL    RDW 11.7 11.0 - 14.5 %    Platelets 264 140 - 440 thou/uL    MPV 7.7 7.0 - 10.0 fL     Mary Maya MD

## 2021-06-22 NOTE — PROGRESS NOTES
PROGRESS NOTE       SUBJECTIVE:  Osbaldo Garcia is a 53 y.o. male   Chief Complaint   Patient presents with     Diabetes     fasting labs      Medication Refill     Osbaldo is here for diabetes check and lab work.  He has been working long hours deicing airplanes.  He has been paying attention to his diet and trying to eat more healthy and less red meat.  He is also taking 2 garlic pills per day.  He certainly is getting more exercise with the increased work.  He had elevated CPK when on higher dose of statin.  He tends to take his statin irregularly.     Patient denies fever, chills, dizziness, headache, visual change, ear pain, cough, chest pain, shortness of breath, abdominal pain, extremity pain or swelling, rash,  depression or anxiety.  The patient denies polyuria, polydipsia and polyphagia.      Patient Active Problem List   Diagnosis     Hyperlipidemia     Acute Myocarditis     Hypertension     Abdominal Pain     Shortness Of Breath     MARY (obstructive sleep apnea)     Hematuria, microscopic     Basal cell carcinoma of neck     CAD (coronary artery disease)       Current Outpatient Medications   Medication Sig Dispense Refill     ascorbic acid (VITAMIN C) 1000 MG tablet Take 1,000 mg by mouth daily.       cholecalciferol, vitamin D3, 1,000 unit tablet Take 1,000 Units by mouth 2000 IU.             clopidogrel (PLAVIX) 75 mg tablet TAKE ONE TABLET BY MOUTH ONCE DAILY 90 tablet 3     garlic 500 mg cap        hydroCHLOROthiazide (HYDRODIURIL) 50 MG tablet TAKE ONE TABLET BY MOUTH ONCE DAILY 90 tablet 2     losartan (COZAAR) 100 MG tablet TAKE ONE TABLET BY MOUTH ONCE DAILY 90 tablet 3     metoprolol succinate (TOPROL-XL) 25 MG Take 1 tablet (25 mg total) by mouth daily. 30 tablet 11     MULTIVIT &MINERALS/FERROUS FUM (MULTI VITAMIN ORAL) Take by mouth.       pantoprazole (PROTONIX) 40 MG tablet Take 1 tablet (40 mg total) by mouth daily. 90 tablet 3     potassium chloride (MICRO-K) 10 mEq CR capsule TAKE ONE  CAPSULE BY MOUTH ONCE DAILY 90 capsule 3     pravastatin (PRAVACHOL) 10 MG tablet Take 1 tablet (10 mg total) by mouth daily. 90 tablet 3     Lactobacillus acidophilus (BACID) 10 billion cell capsule Take by mouth.       nitroglycerin (NITROSTAT) 0.4 MG SL tablet Place 0.4 mg under the tongue.       No current facility-administered medications for this visit.        Social History     Tobacco Use   Smoking Status Former Smoker   Smokeless Tobacco Former User     Quit date: 3/4/2014               OBJECTIVE:       Vitals:    12/03/18 1021   BP: 128/80   Pulse: 68   SpO2: 96%     Weight: (!) 251 lb (113.9 kg)    Wt Readings from Last 3 Encounters:   12/03/18 (!) 251 lb (113.9 kg)   08/29/18 (!) 255 lb (115.7 kg)   02/26/18 (!) 250 lb (113.4 kg)     Body mass index is 35.01 kg/m .        Physical Exam:  GENERAL APPEARANCE: A&A, NAD, well hydrated, well nourished  SKIN:  Normal skin turgor, no lesions/rashes   EARS: TM's normal, gray with nl light reflex  OROPHARYNX: without erythema, no post nasal drainage or thrush  NECK: Supple, without lymphadenopathy, no thyroid mass  CV: RRR, no M/G/R   LUNGS: CTAB, normal respiratory effort  EXTREMITY: Extremities normal, atraumatic, no swelling  NEURO: no gross deficits   PSYCHIATRIC:  Mood appropriate, memory intact        ASSESSMENT/PLAN:     1. Flu vaccine need    - Influenza, Recombinant, Inj, Quadrivalent, PF, 18+YRS    2. Benign essential hypertension  Blood pressures well controlled.  - Basic Metabolic Panel    3. Hypercholesterolemia  I would like to be more aggressive in treating his cholesterol given his dad's history of early cardiac disease.  Patient prefers to work on his diet himself.  - Lipid [place]    Recheck labs in 3-6 months.    I spent a total of 26 minutes face to face with the patient.  Over 50% of the time spent counseling and educating the patient about all of the above.      Mary Maya MD

## 2024-05-18 ENCOUNTER — HOSPITAL ENCOUNTER (EMERGENCY)
Facility: CLINIC | Age: 59
Discharge: HOME | End: 2024-05-18
Attending: SOCIAL WORKER | Admitting: SOCIAL WORKER
Payer: COMMERCIAL

## 2024-05-18 VITALS
SYSTOLIC BLOOD PRESSURE: 119 MMHG | OXYGEN SATURATION: 97 % | RESPIRATION RATE: 17 BRPM | WEIGHT: 234.35 LBS | BODY MASS INDEX: 34.71 KG/M2 | TEMPERATURE: 97.3 F | HEIGHT: 69 IN | DIASTOLIC BLOOD PRESSURE: 82 MMHG | HEART RATE: 74 BPM

## 2024-05-18 DIAGNOSIS — F41.8 DEPRESSION WITH ANXIETY: ICD-10-CM

## 2024-05-18 PROCEDURE — 99283 EMERGENCY DEPT VISIT LOW MDM: CPT

## 2024-05-18 RX ORDER — HYDROXYZINE HYDROCHLORIDE 25 MG/1
25 TABLET, FILM COATED ORAL 3 TIMES DAILY PRN
Qty: 15 TABLET | Refills: 0 | Status: SHIPPED | OUTPATIENT
Start: 2024-05-18 | End: 2024-05-23

## 2024-05-18 ASSESSMENT — ACTIVITIES OF DAILY LIVING (ADL): ADLS_ACUITY_SCORE: 33

## 2024-05-18 ASSESSMENT — COLUMBIA-SUICIDE SEVERITY RATING SCALE - C-SSRS
6. HAVE YOU EVER DONE ANYTHING, STARTED TO DO ANYTHING, OR PREPARED TO DO ANYTHING TO END YOUR LIFE?: NO
1. IN THE PAST MONTH, HAVE YOU WISHED YOU WERE DEAD OR WISHED YOU COULD GO TO SLEEP AND NOT WAKE UP?: NO
2. HAVE YOU ACTUALLY HAD ANY THOUGHTS OF KILLING YOURSELF IN THE PAST MONTH?: NO

## 2024-05-18 NOTE — DISCHARGE INSTRUCTIONS
You were seen in the emergency department for depression as well as anxiety.  I am giving you a medication that you can take when you have a severe anxiety attack, it may make you sleepy.  Please keep your primary care provider appointment on Thursday.    If you start to have a fever, if you have severe sudden confusion, if you have chest pain or difficulty breathing that does not go away, if you have thoughts of killing yourself or others, you are seeing or hearing things that others do not, please come back to the emergency department.

## 2024-05-18 NOTE — ED TRIAGE NOTES
"Pt here w/ son. Pt complains of anxiety that started Monday. Pt denies hx of anxiety. Pt states he went to \"renew badge at work and couldn't do it right\" which set off the anxiety. Pt does not see a therapist.         "

## 2024-05-18 NOTE — ED PROVIDER NOTES
"  Emergency Department Note      History of Present Illness     Chief Complaint  Anxiety    HPI  Osbaldo Garcia is a 58 year old male with history of CAD, hyperlipidemia, and hypertension who presents with his son from Urgent Care for anxiety. The patient states that he had his first panic attack on Monday when he was trying to renew his badge for work. He has had intermittent episodes of anxiety since, including today around noon prior to leaving his house for the first time since his initial panic attack on Monday. The patient states that during these episodes, he feels \"defeated,\" hyperventilates, and his hands shake. No seizure-like activity per son, with whom he lives and who saw the episodes.. He denies ever having any experiences like this, although he did share that he had some suicidal thoughts when he daughter was young decades ago for which he attended a week long in-patient treatment. No recent fever, cough, or illnesses. He denies any thoughts about harming himself or others. No auditory or visual hallucinations.    The patient's son states that he has been in a notably depressive state since Monday and that he has had some stress in the last year related to finances. He states the patient is hard on himself, but denies the patient acting more confused.     Independent Historian  Son as detailed above.    Review of External Notes  Reviewed telemedicine visit from yesterday (5/17/24) where patient was seen for depression: \"Pt stated he was suicidal sometime in the 90s when his daughter was young. The SI was due to a breakup. He recalled sharpening a knife and being close to attempting, but instead, his sister took him to the hospital. He stayed in the inpatient psychiatric unit for one week and had \"lots of therapy.\" He did not recall what his diagnosis was at that time, but reported he has not had SI since then. No SI or HI today. He reported no history of MH diagnosis in his family to his knowledge. " "He stated that while the panic attack today was a new symptom, \"it's been getting worse and worse, accumulating - just not happy and not knowing what to do.\" He described, \"Life didn't go as I thought it should\" and \"just haven't had anything go my way in a long time.\" He reported he is in contact with his daughter and son - no recent changes in his relationship with them. He does not have any friends in [place] - his friends are in [place]. Pt was receptive to recommendations to follow up with primary care doctor and open to therapy and psychiatry referrals. He accepted [facility] crisis line and [facility] crisis line information as well. \"     Also reviewed nurse triage phone call note from yesterday.   Past Medical History   Medical History and Problem List  CAD  Basal cell carcinoma of neck  Hyperlipidemia  Acute myocarditis  Hypertension  MARY  Kidney stones   GERD   NSTEMI     Medications  Atorvastatin   Clopidogrel   Hydrochlorothiazide   Losartan   Metoprolol   Omeprazole   Sertraline   Pravastatin     Surgical History   Nephrolithotomy   Quad tendon repair   Cardiac catherization  Tonsillectomy   Physical Exam   Patient Vitals for the past 24 hrs:   BP Temp Temp src Pulse Resp SpO2 Height Weight   05/18/24 1603 119/82 -- -- -- -- 97 % -- --   05/18/24 1413 (!) 147/91 97.3  F (36.3  C) Temporal 74 17 97 % 1.753 m (5' 9\") 106.3 kg (234 lb 5.6 oz)     Physical Exam  General: Overall stable and nontoxic appearing, well groomed   HEENT: Conjunctivae clear, no scleral icterus, mucous membranes moist  Neuro: Alert, moving all extremities equally with intention, gait is steady   CV: Regular rate and rhythm, radial and DP pulses equal  Respiratory: No signs of respiratory distress, lungs clear to auscultation bilaterally   Abdomen: Soft, without rigidity or rebound throughout  MSK: No lower extremity swelling or tenderness   Psych: Interactive, does not appear to be responding to internal stimuli   Diagnostics   Lab Results "   None    Imaging  None    EKG   None    Independent Interpretation  None  ED Course    Medications Administered  None    Procedures  None    Discussion of Management  None    Social Determinants of Health adding to complexity of care  None    ED Course  ED Course as of 05/18/24 1620   Sat May 18, 2024   1526 I obtained history and examined the patient as noted above. I believed it was safe for the patient to be discharged and he agreed.     Medical Decision Making / Diagnosis   CMS Diagnoses: None    MIPS     None    [facility]  Osbaldo Garcia is a 58 year old male 58-year-old male with a history of remote inpatient hospitalization for suicidal ideation, recent visit with behavioral health and recommended to establish care with therapy and psychiatry providers, who presented to the emergency department chief complaint of anxiety attack.  In the emergency department, patient was at baseline per his son with whom he lives, no focal neurologic deficit ambulating steadily.  He does report some memory difficulties that have been progressive over the years however nothing acute or different.  Afebrile, range of motion of the neck, nontoxic, doubt meningitis encephalitis as etiology for his symptoms.  I doubt seizure with the description of his shaking, no loss of consciousness, patient felt anxiety and had hyperventilation.  He otherwise has had no illnesses, eating and drinking without any difficulties.  I do not feel that further laboratory workup at this time is emergently indicated.  Patient denies any thoughts of self-harm or harm to others, I do not see any criteria for hold at this time.  I did offer for DEC evaluation however patient would prefer to go home and follow-up outpatient and I think this is reasonable.  I counseled close follow-up with his primary care provider.  Prescribed hydroxyzine for severe panic attack episodes.  Strict return precautions were discussed with patient and son.  Patient verbalized  understanding, he was discharged in stable condition.    Disposition  The patient was discharged.     ICD-10 Codes:    ICD-10-CM    1. Depression with anxiety  F41.8            Discharge Medications  Discharge Medication List as of 5/18/2024  3:53 PM        START taking these medications    Details   hydrOXYzine HCl (ATARAX) 25 MG tablet Take 1 tablet (25 mg) by mouth 3 times daily as needed for itching, Disp-15 tablet, R-0, E-Prescribe           Scribe Disclosure:  I, Deirdre Lucia, am serving as a scribe at 3:46 PM on 5/18/2024 to document services personally performed by Jelena Warren MD based on my observations and the provider's statements to me.      Jelena Warren MD  05/19/24 6540